# Patient Record
Sex: MALE | Race: WHITE
[De-identification: names, ages, dates, MRNs, and addresses within clinical notes are randomized per-mention and may not be internally consistent; named-entity substitution may affect disease eponyms.]

---

## 2017-06-25 ENCOUNTER — HOSPITAL ENCOUNTER (INPATIENT)
Dept: HOSPITAL 68 - ERH | Age: 62
LOS: 4 days | End: 2017-06-29
Attending: INTERNAL MEDICINE | Admitting: INTERNAL MEDICINE
Payer: COMMERCIAL

## 2017-06-25 VITALS — HEIGHT: 67 IN | WEIGHT: 150 LBS | BODY MASS INDEX: 23.54 KG/M2

## 2017-06-25 VITALS — SYSTOLIC BLOOD PRESSURE: 114 MMHG | DIASTOLIC BLOOD PRESSURE: 76 MMHG

## 2017-06-25 VITALS — SYSTOLIC BLOOD PRESSURE: 130 MMHG | DIASTOLIC BLOOD PRESSURE: 80 MMHG

## 2017-06-25 VITALS — SYSTOLIC BLOOD PRESSURE: 118 MMHG | DIASTOLIC BLOOD PRESSURE: 73 MMHG

## 2017-06-25 DIAGNOSIS — K29.80: ICD-10-CM

## 2017-06-25 DIAGNOSIS — K29.70: ICD-10-CM

## 2017-06-25 DIAGNOSIS — Z87.891: ICD-10-CM

## 2017-06-25 DIAGNOSIS — R07.9: ICD-10-CM

## 2017-06-25 DIAGNOSIS — K22.70: ICD-10-CM

## 2017-06-25 DIAGNOSIS — F10.129: Primary | ICD-10-CM

## 2017-06-25 DIAGNOSIS — I10: ICD-10-CM

## 2017-06-25 DIAGNOSIS — J44.9: ICD-10-CM
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
## 2017-06-25 DIAGNOSIS — K21.9: ICD-10-CM

## 2017-06-25 DIAGNOSIS — E78.5: ICD-10-CM

## 2017-06-25 DIAGNOSIS — F14.10: ICD-10-CM

## 2017-06-25 DIAGNOSIS — E87.2: ICD-10-CM

## 2017-06-25 DIAGNOSIS — N17.9: ICD-10-CM

## 2017-06-25 LAB
ABSOLUTE GRANULOCYTE CT: 4.2 /CUMM (ref 1.4–6.5)
BASOPHILS # BLD: 0 /CUMM (ref 0–0.2)
BASOPHILS NFR BLD: 0.7 % (ref 0–2)
EOSINOPHIL # BLD: 0.2 /CUMM (ref 0–0.7)
EOSINOPHIL NFR BLD: 2.5 % (ref 0–5)
ERYTHROCYTE [DISTWIDTH] IN BLOOD BY AUTOMATED COUNT: 13.9 % (ref 11.5–14.5)
GRANULOCYTES NFR BLD: 63.7 % (ref 42.2–75.2)
HCT VFR BLD CALC: 35 % (ref 42–52)
LYMPHOCYTES # BLD: 1.7 /CUMM (ref 1.2–3.4)
MCH RBC QN AUTO: 29.5 PG (ref 27–31)
MCHC RBC AUTO-ENTMCNC: 33.5 G/DL (ref 33–37)
MCV RBC AUTO: 88.2 FL (ref 80–94)
MONOCYTES # BLD: 0.5 /CUMM (ref 0.1–0.6)
PLATELET # BLD: 181 /CUMM (ref 130–400)
PMV BLD AUTO: 9.6 FL (ref 7.4–10.4)
RED BLOOD CELL CT: 3.97 /CUMM (ref 4.7–6.1)
WBC # BLD AUTO: 6.7 /CUMM (ref 4.8–10.8)

## 2017-06-25 PROCEDURE — 84133 ASSAY OF URINE POTASSIUM: CPT

## 2017-06-25 PROCEDURE — A9502 TC99M TETROFOSMIN: HCPCS

## 2017-06-25 PROCEDURE — G0480 DRUG TEST DEF 1-7 CLASSES: HCPCS

## 2017-06-25 PROCEDURE — 84300 ASSAY OF URINE SODIUM: CPT

## 2017-06-25 NOTE — RADIOLOGY REPORT
EXAMINATION:
XR CHEST PORTABLE
 
CLINICAL INFORMATION:
Altered mental status.
 
COMPARISON:
Multiple priors, most recent chest radiograph dated 05/19/2016.
 
TECHNIQUE:
Portable frontal view of the chest was obtained.
 
FINDINGS:
Mild bibasilar atelectasis. No focal airspace consolidation. No pleural
effusion or pneumothorax. Stable cardiomediastinal silhouette. No acute
osseous abnormality.
 
IMPRESSION:
Mild bibasilar atelectasis.

## 2017-06-25 NOTE — NUR
MEDICATED IV NARCAN PER EMAR WITH MINIMAL EFFECT.
SECURITY AT BEDSIDE TO WAND PT AND PT'S BELONGINGS.
WIFE AT BEDSIDE.
PT OCCASSIONALY TALKING GARBLED SPEECH, SAYING "JUST LET ME SLEEP".

## 2017-06-25 NOTE — HISTORY & PHYSICAL
CEZAR SALAZAR 06/25/17 2209:
General Information and HPI
MD Statement:
I have seen and personally examined PARISH PADILLA and documented this H&P.
 
The patient is a 62 year old M who presented with a patient stated chief 
complaint of unresponsiveness at home.
 
Source of Information: patient, family, old records
Exam Limitations: no limitations
History of Present Illness:
 
Mr Padilla is a 62-year-old man who was known to be in his usual state of health
until this a.m.  He has a past medical history of hypertension, hyperlipidemia, 
emphysema, peptic ulcer disease.  He was brought to [facility] after he 
became unresponsive at home after a binge alcoholic drink in the afternoon on 
the day of ER admission.
 
As per the patient, he drank heavily (10-12 x beer, hard liquor shots) all 
morning at a bar, and after reaching home he was unresponsive.  He did not have 
any prodromal symptoms before becoming unresponsive, but the wife who was 
present at the time of the event reported that the patient complained of chest 
pain.  Episode lasted for less than 30 minutes, with no loss of bladder bowel 
function, seizures.  After regaining consciousness, he was aware of his 
surroundings, and did not report any headache, lightheadedness, confusion, 
vision changes, any neurological symptoms.  The character of chest pain could 
not be a certain, as the patient did not recollect his symptomatology.  No 
palpitations, shortness of breath, pedal edema, orthopnea were noted.  No 
fatigue, unintentional weight loss was reported.
 
Reported occasional bright red bleeding per rectum, which he attributes it to 
external hemorrhoids.  No abdominal pain, fever, tenesmus, diarrhea were noted. 
Last colonoscopy found colonic polyp, and was advised to follow up every 3 years
with a repeat colonoscopy.  Upper endoscopy revealed Sexton's esophagus, for 
which he is on chronic PPI.  Works as a bread ; and past smoker 
40 pack year history-quit 12 years ago.  Daily alcohol use-4-5 x beers.  No 
IVDA.  Last cocaine use 3 days ago, occasional marijuana use.  Family history 
significant for coronary artery disease in brother and father (age less than 50 
years).  History of colon cancer in mother.
 
Allergies/Medications
Allergies:
Coded Allergies:
venom-honey bee (bee venom (honey bee)) (ANAPHYLAXIS 06/25/17)
morphine (VOMITING 05/19/16)
 
Compliance With Home Meds: POOR
 
Past History
 
Travel History
Traveled to [place] past 21 day No
 
Medical History
Blood Transfusion Hx: No
Neurological: NONE
EENT: NONE
Cardiovascular: hypertension, hyperlipidemia
Respiratory: NONE
Gastrointestinal: GERD, peptic ulcer disease
Hepatic: NONE
Renal: NONE
Musculoskeletal: NONE
Psychiatric: NONE
Endocrine: NONE
Blood Disorders: NONE
Cancer(s): NONE
GYN/Reproductive: NONE
History of MRSA: No
History of VRE: No
History of CDIFF: No
Isolation History: Standard
 
Surgical History
Surgical History: non-contributory
 
Past Family/Social History
 
Family History
Relations & Conditions if any
BROTHER (CAD( age < 50 yrs)).
FATHER (CAD( < 50 yrs)).
MOTHER
 
 
Psychosocial History
Where do you live? Home
Services at Home: None
Smoking Status: Former Smoker
ETOH Use: heavy use
Illicit Drug Use: UTD
 
Review of Systems
 
Review of Systems
Constitutional:
Denies: see HPI, chills, fever, weakness. 
EENTM:
Denies: blurred vision, double vision, hearing changes. 
Cardiovascular:
Reports: chest pain, peripheral edema.  Denies: edema, orthopena, palpitations, 
syncope. 
Respiratory:
Denies: cough, short of breath. 
GI:
Reports: bloody stool.  Denies: abdominal pain, diarrhea, melena, vomiting. 
Genitourinary:
Denies: discharge. 
Musculoskeletal:
Denies: back pain, joint pain. 
Skin:
Denies: change in skin color, change in hair/nails. 
Neurological/Psychological:
Denies: anxiety, confusion, headache, numbness, paresthesia. 
 
Exam & Diagnostic Data
Last 24 Hrs of Vital Signs/I&O
 Vital Signs
 
 
Date Time Temp Pulse Resp B/P B/P Pulse O2 O2 Flow FiO2
 
     Mean Ox Delivery Rate 
 
06/25 2138 97.8 81 20 130/80  95   
 
06/25 2130       Room Air  
 
06/25 2026 98.6 78 18 118/73     
 
06/25 2025 98.6 78 18 118/73  97 Room Air  
 
06/25 1830 98.0 76 16 114/76     
 
06/25 1806      97 Room Air  
 
06/25 1802  84 16 114/76  98 Room Air  
 
06/25 1711 98.8 68 17 91/55  94 Room Air  
 
06/25 1629  80 18 122/58  95 Nasal 2.0L 
 
       Cannula  
 
06/25 1535      95 Nasal 2.0L 
 
       Cannula  
 
06/25 1516  81 26 133/71  95 Nasal 3.0L 
 
       Cannula  
 
06/25 1454 98.7 78 16 157/81  96 Room Air  
 
 
 Intake & Output
 
 
 06/26 0800 06/26 0000 06/25 1600
 
Intake Total  0 
 
Output Total  400 
 
Balance  -400 
 
    
 
Intake, IV  0 
 
Intake, Oral  0 
 
Number  0 
 
Bowel   
 
Movements   
 
Output, Urine  400 
 
Patient  150 lb 
 
Weight   
 
Weight  Reported by Patient 
 
Measurement   
 
Method   
 
 
 
 
Physical Exam
General Appearance Alert, Oriented X3, Cooperative, No Acute Distress
Skin No Rashes, No Breakdown, No Significant Lesion
Skin Temp/Moisture Exam: Warm/Dry
Sepsis Skin Exam (color): Normal for Ethnicity
HEENT Atraumatic, PERRLA, EOMI
Neck Supple, No JVD, No thryomegaly, +2 Carotid Pulse wo Bruit, No LAD
Lymphatic submandibular lnpathy 1.5 cm
Cardiovascular Regular Rate, Normal S1, Normal S2, No Murmurs
Lungs Normal Air Movement, basal crackles
Abdomen Normal Bowel Sounds, Soft, No Tenderness, No Hepatospenomegaly
Neurological Normal Speech, Strength at 5/5 X4 Ext, Normal Tone, Sensation 
Intact, Cranial Nerves 3-12 NL, Reflexes 2+
Extremities No Cyanosis, No Edema, Normal Pulses
Vascular Normal Pulses, Pulses Symmetrical
Sepsis Peripheral Pulse Location: Dorsalis Pedis
Sepsis Peripheral Pulse Exam: Normal
Sepsis Cap Refill Exam: <2 Sec
Body Front and Back (Adult)
 
  1) hemorrhodal surgery scar
Last 24 Hrs of Labs/Jake:
 Laboratory Tests
 
06/25/17 2258:
Troponin I < 0.01
 
06/25/17 1630:
Urine Opiates Screen < 100.00, Methadone Screen < 40, Barbiturate Screen < 60, 
Ur Phencyclidine Scrn < 6.00, Amphetamines Screen < 100, U Benzodiazepines Scrn 
85, Urine Cocaine Screen > 1000  H, Urine Cannabis Screen 66.00  H, Ur Random 
Creatinine 46.1, Ur Random Sodium 76, Ur Random Potassium 11.7, Fraction Sodium 
Excret 2.7  H
 
06/25/17 1520:
Anion Gap 13, Estimated GFR 29  L, BUN/Creatinine Ratio 14.8, Glucose 94, 
Hemoglobin A1c Pending, Calcium 9.2, Magnesium 2.4  H, Iron 99, TIBC 315, 
Ferritin 89.1, Total Bilirubin 0.3, AST 19, ALT 25, Alkaline Phosphatase 58, 
Creatine Kinase 209  H, Troponin I < 0.01, Total Protein 6.7, Albumin 3.9, 
Globulin 2.8, Albumin/Globulin Ratio 1.4, CBC w Diff NO MAN DIFF REQ, RBC 3.97  
L, MCV 88.2, MCH 29.5, RDW 13.9, MPV 9.6, Gran % 63.7, Lymphocytes % 24.9, 
Monocytes % 8.2, Eosinophils % 2.5, Basophils % 0.7, Absolute Granulocytes 4.2, 
Absolute Lymphocytes 1.7, Absolute Monocytes 0.5, Absolute Eosinophils 0.2, 
Absolute Basophils 0, PUBS MCHC 33.5, Salicylates < 1.0, Acetaminophen < 10.0  L
, Serum Alcohol 246.0
 
 
Diagnostic Data
EKG Results
Normal sinus  rhythm
Normal axis
No ST TW changes
CXR Results
Mild bibasilar atelectasis.
Other Results
 CAT - CT HEAD WO IV CONTRAST
 
1. No acute intracranial hemorrhage or mass effect.
 
2. Mild prominence of the sulci and mild hypoattenuation of the
periventricular white matter, which may represent chronic small vessel
ischemic disease.
 
Assessment/Plan
Assessment:
 
His middle-aged man with a past history of heavy alcohol use, smoking, 
hypertension, family history of heart disease (multiple risk factors for 
coronary disease) is being evaluated for unresponsiveness.
 
At the time of admission, vitals-impression 98.7, pulse rate 78, respirations 16
, blood pressure 157/81, pulse ox 96% on room air.  Lab findings indicated no 
leukocytosis, anemia (hemoglobin 11.7-Baseline 15.3 mg/dl in 05/2016), platelets
181. 
 
Normal electrolytes, abnormal kidney function BUN 34, serum creatinine 2.3 (
baseline 0.9), FENa 2.7( kidney injury ? heavy drug use ).  Liver function test 
within normal limits.  Cardiac enzymes-within normal limits.  U tox was positive
for cocaine and cannabis.  EKG revealed normal sinus rhythm, normal axis, 
nonspecific ST changes.
 
Differential diagnoses: #1 syncope #3 arrhythmia #4 drug overdose #5 acute 
kidney injury
 
Below is the problem list and plan:
 
#1 syncope-exact cause is uncertain at this time, but alcohol/drug use seems 
more likely.  However, cardiac cause needs to be ruled out.  Patient has a 
strong family history and other risk factors.  Serial cardiac enzymes and 
electrocardiograms.  May need a stress test as an outpatient.  Cardiac consult 
in the a.m. if the tests are abnormal.  History of chest pain is unclear, and 
hence no aspirin was given at this time.  Continue to monitor the patient on 
telemetry.  CT head was unremarkable.
 
#2 Anemia-an acute drop in H&H 15.3-->11.7.  Hemoccult negative.  Monitor for 
any sudden drop.  We will need a follow-up with the GI as an outpatient.  Iron 
studies.
 
#3 alcohol use, cocaine use-Ativan as per CIWA protocol, and standing order.  
Avoid all beta blockers.  Monitor vitals closely. 
 
#4 acute kidney injury-likely from alcohol/drug use.  Creatinine kinase slightly
elevated; recheck in the a.m.  Cocaine is known to cause myoglobulinuria. 
Elevated FENa.  Fluids.
 
#5 history of GERD/Sexton's-continue home dose of PPI.
 
#6 DVT prophylaxis-mechanical.
 
As Ranked By This Provider
Problem List:
 1. DENISE (acute kidney injury)
 
 2. Alcohol intoxication
 
 3. Altered mental status
 
 
Core Measures/Miscellaneous
 
Acute Coronary Syndrome
ACS Diagnosis: No
 
Cerebrovascular Accident
CVA/TIA Diagnosis: No
 
Congestive Heart Failure
CHF Diagnosis: No
 
VTE (View Protocol)
VTE Risk Factors: Age > 40
No [facility] VTE prophylaxis d/t: No contraindications
No VTE Pharm Prophylaxis d/t: No contraindications
VTE Diagnosis: No
VTE Type: NONE
VTE Confirmed by (Test): NONE
 
Sepsis (View Protocol)
Severe Sepsis Present: No
 
Septic Shock
Septic Shock Present: No
 
Miscellaneous Documentation
Attending Case Discussed With:
LEILA GUARDADO MD
 
Primary Care Physician:
REYNA MADERA MD
 
Patient sees these Specialists
Dr. Jewell
Level of Patient Care: Telemetry
 
LASHAUN HERNANDES,[facility] 06/25/17 7974:
General Information and HPI
 
Allergies/Medications
Home Med list
Albuterol Sulfate (Proair Hfa) 90 MCG HFA.AER.AD   2 PUF INH AD PRN EMPHYSEMA  (
Reported)
Amlodipine Besylate 5 MG TABLET   1 TAB PO DAILY Hypertension
Folic Acid 1 MG TABLET   1 TAB PO DAILY SUPPLEMENT
Multivitamin (One Daily Multivitamin) 1 EACH TABLET   1 TAB PO DAILY SUPPLEMENT
Pantoprazole Sodium 40 MG TABLET.DR   1 TAB PO DAILY GI  (Reported)
Thiamine HCl (Vitamin B-1) 100 MG TABLET   1 TAB PO DAILY SUPPLEMENT
 
 
 
Resident Review Statement
Resident Statement: examined this patient, discussed with intern, agreed with 
intern, discussed with family
Other Findings:
The patient is a 62-year-old man with a past medical history of hypertension, 
GERD, peptic ulcer disease, emphysema, and lower GI bleeding from hemorrhoids.  
He was brought in by ambulance today after being found unresponsive at home by 
his wife.  According to his wife the patient has been having headaches recently 
for the past 1-2 weeks.  He went to a bar this morning on 9 AM and drank 10-12 
beers and over 5 shots of whiskey up to 2 PM this afternoon after which she 
returned home.  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 
his club. Shortly after arriving home she saw him lay down on his bed. Some 
minutes later, she went to arouse him and found him to be unresponsive. He 
subsequently became semiconscious and was going in and out of consciousness with
his eyes half open. At this time he began to complain of severe retrosternal 
chest pain and shortness of breath.  This chest pain episode lasted for about 1 
hour before she called 911, and he was brought to the hospital.  EMS at the 
scene said that his pupils were miotic and he was having bradypnea.  He received
1 dose of Narcan in the field.  Subsequently became more aroused in the ER and 
is now alert and awake.  He does admit to using cocaine, however he stated that 
his last use was sniffing a single line 3 days ago.
 
Vital signs on admission in the ER showed a temperature of 98.7 Fahrenheit, 
pulse of 78 bpm, respiratory rate of 16/min, blood pressure 157/81 mmhg, pulse 
oximetry of 96 on room air.
 
Physical Exam
General: Appearance Alert, oriented 3, not in distress
HEENT: EOMI, Mucous Membranes moist
Neck: 1.5 cm 1.5 cm soft mobile left submandibular lymph node, no thyromegaly
Cardiovascular: Normal S1, Normal S2, no murmurs
Lungs: Normal Air Movement, Diminshed breath sounds in the lung bases 
bilaterally with few fine crepitations
Abdomen: Soft, No Tenderness, Normal Bowel Sounds
Rectal: Normal rectal tone, stool guaiac negative
Back: No CVA tenderness 
Neurological: Nomal Speech, Strength 5/5 in bilateral lower and upper limbs, no 
tremor
Extremities: No pedal edema
Vascular: Pulses Symmetrical, Skin is warm to touch
 
Labs at admission showed CBC with WBC of 6700, hemoglobin of 11.7 g/dL, 
hematocrit of 35%, platelet count of 181,000. Chemistries showed sodium of 143, 
potassium of 4.1, creatinine of 2.3 mg/DL, glucose 94 mg per DL, magnesium 2.4, 
creatinine kinase 29.  Total bili AST and ALT were all normal at 0.3, 19 and 25 
respectively.  Alkaline phosphatase was normal at 58. 
 
Urine toxicology was positive for cocaine greater than 1000 ng/ml, cannabis at 
68 and serum alcohol 246.
 
Assessment
This patient presents with syncope shortly after an alcohol binge with attendant
recent history of cocaine use.  He also confesses to having some chest pain 
shortly after the syncopal episode but denies chest pain at present.  She is 
head CT showed no acute lesion.  His symptoms are concerning for an acute 
coronary syndrome given his recent cocaine use (He probably used cocaine today, 
even though he denies this). Otherwise a syncope from cocaine overdose is also a
possibility. Acute alcohol intoxication could also be responsible for these 
symptoms. His wife did admit to prior episodes of chest discomfort resulting in 
ED visits in the past which were attributed to GERD.  However he should be 
monitored on telemetry for any cardiac events.  In addition we will manage his 
alcohol withdrawal with as needed and standing Ativan.  His creatinine is 
elevated and his acute kidney injury is likely prerenal due to dehydration, 
however cocaine-induced acue tubular necrosis or acute interstitial neprhritis 
must be considered in the differential.
 
Problem list
1.  Syncope
2.  Alcohol withdrawal and cocaine abuse for detox
3.  Acute kidney injury
4.  Normocytic anemia
5.  Hypertension
6.  Emphysema
7.  Left submandibular lymph node
 
Plan
1.  Syncope
* Admit to telemetry for monitoring
* Serial EKGs 3 and troponin 3
* Consider echocardiogram in the AM
* Monitor blood pressure heart rate closely
 
2.  Alcohol withdrawal and cocaine abuse for detox
* IV Ativan as per CIWA protocol
* By mouth Ativan 2 mg every 6 hours standing
* IV banana bag 1 then continue with by mouth thiamine, B12 and folate
 
3.  Acute kidney injury
* Hydrate with IV banana bag 1 then we will continue hydration with IV normal 
saline at 75 mL an hour
* Add on Urinalysis and Urine electrolytes/FENA to investigte cocaine-induced 
acue tubular necrosis or acute interstitial neprhritis
* Repeat BEP in the morning and monitor renal function
* Monitor fluid input-output closely
 
4.  Normocytic anemia
* Hemoglobin has dropped since last measured in 2016
* Stools were guaiac-negative
* We'll send iron TIBC and ferritin with vitamin B-12
 
5.  Hypertension
* Continue by mouth lisinopril 20 mg daily
* Monitor blood pressure closely
 
6.  Emphysema
* TRC evaluation for possible nebulizer therapy
* Continue albuterol inhaler every 4 hours when necessary as needed for 
shortness of breath
 
7. Left submandibular lymph node
* Consider inpatient or outpatient ultrasound of neck followed by lymph node 
biopsy
 
Pain pathway:.  Tylenol 650 mg every 6 when necessary for mild pain, by mouth 
Motrin 600 mg every 6 when necessary for moderate pain
 
DVT prophylaxis with Alps
Patient is full code
 
 
LEILA GUARDADO 06/26/17 0401:
Attending MD Review Statement
 
Attending Statement
Attending MD Statement: examined this patient, discuss w/resident/PA/NP, agreed 
w/resident/PA/NP, reviewed EMR data (avail), reviewed images, amended to note
Attending Assessment/Plan:
 
CC: Passing out, CP 
PMH: HTN, COPD, colonic polyps, hemorrhoids
 
Patient was brought in ER through EMS for altered mental status. History is 
partly provided by patient and his wife. Patient states that he had breakfast 
and then he went out to drink beer, he drank few of them and then few shots of 
vodka. He also endorses cocaine use, then he drove home and he was feeling 
uneasy, not well so his wife called EMS. According to his wife patient came home
, directly went to bed, when in few minutes she was checking on him he was not 
responding, he was in and out of his sensorium and in between complaining of 
chest pain. When he was not responding appropriately she called EMS. Patient 
received a dose of Narcan in ER and 1 L normal saline bolus. 14 point ROS 
unremarkable except patient has intermittent bright red blood per rectum, last 
episode 3 days back. Patient states that he has multiple polyps in his colon and
gets frequent colonoscopies.
 
Vitals: Afebrile, pulse in 70s, RR 16, blood pressure 157/81 transiently went to
91/55 improved to 114/76, saturating well on room air
On exam: A O 3, cooperative, no acute distress, neck supple, JVD normal, no 
lymphadenopathy, mucosa dry, no focal neurological deficit, no dependent edema, 
no obvious skin rashes or inflammation CVS: S1-S2, RRR. RS: Clear to auscultate 
bilaterally. Abdomen: Soft, NT, ND, bowel sounds present. 
Labs: Hemoglobin 11.1, MCV 88.2, otherwise CBC unremarkable. Bicarbonate 19, 
anion gap 13, BUN 34, creatinine 2.3, LFT unremarkable, troponin less than 0.01,
 
U tox positive for cocaine and THC
Serum alcohol 246
 
Imaging:
Head CT:
1. No acute intracranial hemorrhage or mass effect. 
2. Mild prominence of the sulci and mild hypoattenuation of the
periventricular white matter, which may represent chronic small vessel ischemic 
disease. 
CXR: Mild bibasilar atelectasis. 
 
A and P 
 
62-year-old male with a past medical history significant for HTN, COPD, 
alcoholism, lower GI bleed presented to ER after possible syncope. History is 
provided by patient's wife who stated that patient was not responding at home, 
she did not notice any seizure-like activity, bladder or bowel incontinence. 
Patient was complaining of chest pain. Patient had high alcohol level at arrival
, received a dose of Narcan and IV hydration. eventually patient was more 
arousable. It is likely that patient may have lost consciousness secondary to 
alcoholism but other causes of syncope should be ruled out given his age and 
comorbidities. He also endorses cocaine use and had chest pain, ACS should be 
ruled out. Creatinine is elevated and hemoglobin trending down as compared to 
previous labs. 
 
+ Syncope
+ Chest pain
+ Alcoholism
+ Cocaine abuse
+ Acute kidney injury
+ Metabolic acidosis
+ History of HTN, COPD, lower GI bleed
 
- Admit to telemetry
- Continuous telemetry monitoring
- Serial troponin and EKG
- Aspirin 81 mg
- Continue home doses of antihypertensives
- Avoid beta blocker
- Gentle hydration, high-dose thiamine
- Scheduled Ativan 2 mg by mouth every 8 hour and when necessary Ativan 
according to CIWA score
- Replace electrolytes
- Strict I's and O's
- Post void bladder scan
- Check CPK today and tomorrow
- Check orthostatic vitals in a.m.
- No obvious murmurs on auscultation: No 2-D echo at this time for syncope 
workup
- When necessary nebulization
- DVT prophylaxis with heparin

## 2017-06-25 NOTE — NUR
REPORT RECIEVED FROM BRITANY HOLT.
PATIENT TO CT SCAN OFF CM - OK PER DR. SANCHES. CEDRIC SRINIVASAN ACCOMPANIED
PATIENT.

## 2017-06-25 NOTE — NUR
PT UNABLE TO GIVE URINE SAMPLE AT THIS TIME, MORE EASILY AROUSABLE. STATES WILL
TRY AGAIN IN 15 MINS.

## 2017-06-25 NOTE — CT SCAN REPORT
EXAMINATION:
CT HEAD WITHOUT CONTRAST
 
CLINICAL INFORMATION:
Altered mental status.
 
COMPARISON:
No relevant prior studies are available for comparison.
 
TECHNIQUE:
Contiguous axial imaging was performed from the skull base to vertex without
intravenous administration of contrast.
 
DLP:
879.77 mGy-cm
 
FINDINGS:
There is no evidence of acute intracranial hemorrhage or territorial
infarction. No abnormal mass effect or midline shift is seen. Gray to white
matter differentiation is well preserved. No extra-axial fluid collections
are identified.
 
The ventricles are normal in size. There is mild prominence of the sulci.
There is mild hypoattenuation of the periventricular white matter, which
could represent chronic small vessel ischemic disease. The osseous structures
and soft tissues are normal. The mastoid air cells and visualized portions of
the paranasal sinuses are well aerated.
 
IMPRESSION:
1. No acute intracranial hemorrhage or mass effect.
 
2. Mild prominence of the sulci and mild hypoattenuation of the
periventricular white matter, which may represent chronic small vessel
ischemic disease.
 
If the patient's symptomatology continues an MRI scan could be considered if
patient is an appropriate candidate and there are no contraindications to
MRI.

## 2017-06-25 NOTE — NUR
PT VOIDED 500 ML CLEAR URINE INTO URINAL. UTOX SENT WITH UA/CNS TUBES
REPORT GIVEN TO COBY HOLT, PT MOVED TO ROOM 2 WITH SITTER

## 2017-06-25 NOTE — NUR
ASSUMED CARE OF PT FROM JONATHON TENORIO. PT NSR ON CARDIAC MONITOR. AWAITING TELE
ADMISSION AT THIS TIME.

## 2017-06-25 NOTE — ED GENERAL ADULT
History of Present Illness
 
General
Chief Complaint: Altered Mental Status
Stated Complaint: BIBA AMS
Source: patient
Exam Limitations: not alert/orientated, clinical condition, poor historian
 
Vital Signs & Intake/Output
Vital Signs & Intake/Output
 Vital Signs
 
 
Date Time Temp Pulse Resp B/P B/P Pulse O2 O2 Flow FiO2
 
     Mean Ox Delivery Rate 
 
 1830 98.0 76 16 114/76     
 
 1806      97 Room Air  
 
 1802  84 16 114/76  98 Room Air  
 
 1711 98.8 68 17 91/55  94 Room Air  
 
 1629  80 18 122/58  95 Nasal 2.0L 
 
       Cannula  
 
 1535      95 Nasal 2.0L 
 
       Cannula  
 
 1516  81 26 133/71  95 Nasal 3.0L 
 
       Cannula  
 
 1454 98.7 78 16 157/81  96 Room Air  
 
 
Allergies
Coded Allergies:
venom-honey bee (bee venom (honey bee)) (ANAPHYLAXIS 17)
morphine (VOMITING 16)
 
Reconcile Medications
Albuterol Sulfate (Proair Hfa) 90 MCG HFA.AER.AD   2 PUF INH AD PRN EMPHYSEMA  (
Reported)
Lisinopril 20 MG TABLET   1 TAB PO DAILY BP  (Reported)
Pantoprazole Sodium 40 MG TABLET.DR   1 TAB PO DAILY GI  (Reported)
 
Triage Nurses Notes Reviewed? yes
Onset: Abrupt
Duration: hour(s):
Timing: recent history
HPI:
 
 
 
17
4 pm
This is a 62-year-old man who presents to the emergency department unresponsive.
 According to EMS and the patient's wife he was drinking at a bar today.  He 
drove home and was intoxicated.  Shortly after he became unresponsive.  He did 
have miotic pupils and was given Narcan in the field.  There is no focal 
weakness.  He has intermittent periods of bradyapena.
Says the symptoms were abrupt, the duration is really unknown, the severity 
significant; as his symptoms required him to come to the emergency department by
ambulance.
 
Past History
 
Travel History
Traveled to [place] past 21 day No
 
Medical History
Any Pertinent Medical History? see below for history
Cardiovascular: hypertension, hyperlipidemia
Gastrointestinal: GERD, peptic ulcer disease
 
Surgical History
Surgical History: non-contributory
 
Psychosocial History
Who do you live with Family
Services at Home None
What is your primary language English
Tobacco Use: UN
ETOH Use: 5
Illicit Drug Use: UTD
 
Family History
Hx Contributory? No
 
Review of Systems
 
Review of Systems
Constitutional:
Denies: fever. 
EENTM:
Reports: no symptoms. 
Respiratory:
Reports: see HPI. 
Cardiovascular:
Denies: chest pain. 
GI:
Reports: no symptoms. 
Genitourinary:
Reports: no symptoms. 
Musculoskeletal:
Reports: no symptoms. 
Skin:
Reports: no symptoms. 
Neurological/Psychological:
Reports: confusion. 
Hematologic/Endocrine:
Denies: bruising, bleeding. 
 
Physical Exam
 
Physical Exam
General Appearance: well developed/nourished, RESPONDS TO PAIN
Head: atraumatic
Eyes:
Bilateral: PERRL, EOMI. 
Ears, Nose, Throat: normal pharynx
Neck: normal inspection, supple
Respiratory: normal breath sounds, chest non-tender, BRADYPENA
Cardiovascular: regular rate/rhythm
Peripheral Pulses:
4+ radial (R), 4+ radial (L)
Back: decreased range of motion
Extremities: pedal edema
Neurologic/Psych: RESPONDS TO PAIN
Skin: intact, normal color, warm/dry
 
Core Measures
ACS in differential dx? No
CVA/TIA Diagnosis: No
Severe Sepsis Present: No
Septic Shock Present: No
 
Progress
Differential Diagnoses
I considered the following diagnoses in my evaluation of the patient: [Alcohol 
intoxication, substance abuse, TIA, CVA, sepsis]
 
Plan of Care:
 Orders
 
 
Procedure Date/time Status
 
Heart Healthy Diet  B Active
 
Patient Data  1915 Active
 
Admit to inpatient  1848 Active
 
Code Status  1848 Active
 
CIWA  1830 Active
 
Continuous Observation Monitor  1543 Active
 
URINE DRUG SCREEN FOR ER ONLY  1515 Complete
 
ACETOMINOPHEN  1515 Complete
 
TROPONIN LEVEL  1515 Complete
 
SALICYLATE  1515 Complete
 
ETHANOL  1515 Complete
 
COMPREHENSIVE METABOLIC PANEL  1515 Complete
 
CBC WITHOUT DIFFERENTIAL  1515 Complete
 
Intake & Output  1456 Active
 
EKG  1454 Active
 
 
 Laboratory Tests
 
 
 
17 1630:
Urine Opiates Screen < 100.00, Methadone Screen < 40, Barbiturate Screen < 60, 
Ur Phencyclidine Scrn < 6.00, Amphetamines Screen < 100, U Benzodiazepines Scrn 
85, Urine Cocaine Screen > 1000  H, Urine Cannabis Screen 66.00  H
 
17 1520:
Anion Gap 13, Estimated GFR 29  L, BUN/Creatinine Ratio 14.8, Glucose 94, 
Calcium 9.2, Total Bilirubin 0.3, AST 19, ALT 25, Alkaline Phosphatase 58, 
Troponin I < 0.01, Total Protein 6.7, Albumin 3.9, Globulin 2.8, Albumin/
Globulin Ratio 1.4, CBC w Diff NO MAN DIFF REQ, RBC 3.97  L, MCV 88.2, MCH 29.5,
RDW 13.9, MPV 9.6, Gran % 63.7, Lymphocytes % 24.9, Monocytes % 8.2, Eosinophils
% 2.5, Basophils % 0.7, Absolute Granulocytes 4.2, Absolute Lymphocytes 1.7, 
Absolute Monocytes 0.5, Absolute Eosinophils 0.2, Absolute Basophils 0, PUBS 
MCHC 33.5, Salicylates < 1.0, Acetaminophen < 10.0  L, Serum Alcohol 246.0
Initial ED EKG: NSR
Prior EKG: unchanged
Comments:
PATIENT: PARISH YOUNG  MEDICAL RECORD NO: [id number]
PRESENT AGE: 62  PATIENT ACCOUNT NO: [id number]
: 59  LOCATION: [facility]
ORDERING PHYSICIAN: JOSE JUAN SANCHES DO  
 
  SERVICE DATE: 
EXAM TYPE: CAT - CT HEAD WO IV CONTRAST
 
EXAMINATION:
CT HEAD WITHOUT CONTRAST
 
CLINICAL INFORMATION:
Altered mental status.
 
COMPARISON:
No relevant prior studies are available for comparison.
 
TECHNIQUE:
Contiguous axial imaging was performed from the skull base to vertex without
intravenous administration of contrast.
 
DLP:
879.77 mGy-cm
 
FINDINGS:
There is no evidence of acute intracranial hemorrhage or territorial
infarction. No abnormal mass effect or midline shift is seen. Gray to white
matter differentiation is well preserved. No extra-axial fluid collections
are identified.
 
The ventricles are normal in size. There is mild prominence of the sulci.
There is mild hypoattenuation of the periventricular white matter, which
could represent chronic small vessel ischemic disease. The osseous structures
and soft tissues are normal. The mastoid air cells and visualized portions of
the paranasal sinuses are well aerated.
 
IMPRESSION:
1. No acute intracranial hemorrhage or mass effect.
 
2. Mild prominence of the sulci and mild hypoattenuation of the
periventricular white matter, which may represent chronic small vessel
ischemic disease.
 
If the patient's symptomatology continues an MRI scan could be considered if
patient is an appropriate candidate and there are no contraindications to
MRI.
 
 
DICTATED BY: DONALDO PAEZ MD 
DATE/TIME DICTATED:17
:[email or web address] 
DATE/TIME TRANSCRIBED:17
 
CONFIDENTIAL, DO NOT COPY WITHOUT APPROPRIATE AUTHORIZATION.
 
 <Electronically signed in Other Vendor System>                                 
          
                                           SIGNED BY: DONALDO PAEZ MD  1544
 
 
 
 
Departure
 
Departure
Disposition: STILL A PATIENT
Condition: Stable
Clinical Impression
Primary Impression: DENISE (acute kidney injury)
Secondary Impressions: Altered mental status, TIA (transient ischemic attack)
Referrals:
ASNTY HERNANDES,REYNA KOTHARI (PCP/Family)
 
Departure Forms:
Customer Survey
General Discharge Information
Comments
 
 
Patient was treated with IV fluids, METAL STAUS IMPROVED.
 
Admission Note
Spoke With:
LEILA GUARDADO MD
Documentation of Exam:
Documentation of any treatments & extenuating circumstances including Concerns 
Regarding Discharge (functional status, medication knowledge or non-compliance, 
living conditions, etc.) that warrant an admission rather than observation: [He 
needs admission for IV fluids, serial neuro exams, serial troponins, serial EKG,
repeat kidney function]
 
 
Critical Care Note
 
Critical Care Note
Critical Care Time: 30-74 min

## 2017-06-25 NOTE — NUR
DR SANCHES IN ROOM TO REEVALUATE PATIENT. FAMILY CALLING FOR UPDATES AND MD
INFORMED TO PROVIDE UPDATE

## 2017-06-25 NOTE — NUR
PT BIBA FROM HOME FOR ALTERED MENTAL STATUS & ETOH
USE WITH PERIODS OF APNEA. PT WAS AT THE BAR
TODAY, DRANK WHISKEY, AND THEN DROVE HOME. 1 HOUR
AFTER BEING HOME PT BEGAN TO C/O SOB. WIFE CALLED
911. UPON EMS ARRIVAL TO PT'S HOME PT WAS ALERT
WITH PINPOINT PUPILS, AND PERIODS OF APNEA. 
PT RECEIVED INTRANASAL NARCAN WITH NO EFFECT.
PT ADMITTED TO EMS THAT HE OCCASIONALLY SMOKES
CRACK. PT ARRIVES UNREPONSIVE TO PAINFUL/VERBAL
STIMULI. PT HAS PRE-HOSP TO [facility].
#2O EST TO . DR SANCHES IN FOR EVAL UPON PT'S 
ARRIVAL TO ROOM. CHANGED INTO GOWN. PLACED ON
MONITOR.

## 2017-06-25 NOTE — NUR
1 BELONGINGS BAG GIVEN TO SECURITY, PATIENTS GLASSES IN BAG
PATIENT'S WALLET GIVEN TO WIFE/SON, OK PER PT

## 2017-06-26 VITALS — SYSTOLIC BLOOD PRESSURE: 156 MMHG | DIASTOLIC BLOOD PRESSURE: 90 MMHG

## 2017-06-26 VITALS — DIASTOLIC BLOOD PRESSURE: 80 MMHG | SYSTOLIC BLOOD PRESSURE: 140 MMHG

## 2017-06-26 LAB
ABSOLUTE GRANULOCYTE CT: 3.7 /CUMM (ref 1.4–6.5)
BASOPHILS # BLD: 0 /CUMM (ref 0–0.2)
BASOPHILS NFR BLD: 0.7 % (ref 0–2)
EOSINOPHIL # BLD: 0.2 /CUMM (ref 0–0.7)
EOSINOPHIL NFR BLD: 2.9 % (ref 0–5)
ERYTHROCYTE [DISTWIDTH] IN BLOOD BY AUTOMATED COUNT: 14.3 % (ref 11.5–14.5)
GRANULOCYTES NFR BLD: 68 % (ref 42.2–75.2)
HCT VFR BLD CALC: 34.3 % (ref 42–52)
LYMPHOCYTES # BLD: 1 /CUMM (ref 1.2–3.4)
MCH RBC QN AUTO: 29.9 PG (ref 27–31)
MCHC RBC AUTO-ENTMCNC: 33.3 G/DL (ref 33–37)
MCV RBC AUTO: 89.6 FL (ref 80–94)
MONOCYTES # BLD: 0.5 /CUMM (ref 0.1–0.6)
PLATELET # BLD: 158 /CUMM (ref 130–400)
PMV BLD AUTO: 9.9 FL (ref 7.4–10.4)
RED BLOOD CELL CT: 3.83 /CUMM (ref 4.7–6.1)
WBC # BLD AUTO: 5.4 /CUMM (ref 4.8–10.8)

## 2017-06-26 NOTE — CONS- CARDIOLOGY
General Information and HPI
 
Consulting Request
Date of Consult: 06/26/17
Requested By:
CHRIS ARREAGA MD
 
Reason for Consult:
Syncopal episode followed by chest pain.
Source of Information: patient, family, old records
Exam Limitations: poor historian
History of Present Illness:
Mr. Prudencio Padilla is a 62-year-old  male with a history of very heavy
former tobacco use (discontinued 3 ppd 20 yr hx of tobacco use 12 years ago) 
COPD, peptic ulcer disease, hypertension, dyslipidemia, "borderline" diabetes 
mellitus, and very strong history for premature coronary artery disease (father 
succumbed to an MI at age 40 years, brother succumbed to an MI at age 40 years, 
another brother had several myocardial infarctions and succumbed to an MI at age
50 years) who presented [facility] on 06/25/2017 from home via ambulance after 
becoming unresponsive following a morning and early afternoon of binge drinking 
(10-12 beers; multiple shots of liquor).
 
The patient cannot recall anything prior to his "coming to an ambulance", but 
does state that he then recalls experiencing anterior chest "heaviness" of 5/10 
intensity without radiation or associated symptoms that lasted approximately 20 
minutes before subsiding.
 
He denies ever experiencing similar symptoms or having any history of coronary, 
valvular, dysrhythmic/conduction disease or cardiomyopathy.  
 
 
 
Allergies/Medications
Allergies:
Coded Allergies:
venom-honey bee (bee venom (honey bee)) (ANAPHYLAXIS 06/25/17)
morphine (VOMITING 05/19/16)
 
Home Med List:
Albuterol Sulfate (Proair Hfa) 90 MCG HFA.AER.AD   2 PUF INH AD PRN EMPHYSEMA  (
Reported)
Lisinopril 20 MG TABLET   1 TAB PO DAILY BP  (Reported)
Pantoprazole Sodium 40 MG TABLET.DR   1 TAB PO DAILY GI  (Reported)
 
 
Review of Systems
Review of Systems:
A 14 point system review was obtained and was noncontributory, other than for 
the fact the patient wears glasses, has had recurrent "pleurisy", and easy 
bruisability.
 
Past History
 
Travel History
Traveled to [place] past 21 day No
 
Medical History
Blood Transfusion Hx: No
Neurological: NONE
EENT: NONE
Cardiovascular: hypertension, hyperlipidemia
Respiratory: NONE
Gastrointestinal: GERD, peptic ulcer disease
Hepatic: NONE
Renal: NONE
Musculoskeletal: NONE
Psychiatric: NONE
Endocrine: NONE
Blood Disorders: NONE
Cancer(s): NONE
GYN/Reproductive: NONE
 
Surgical History
Surgical History: non-contributory
 
Family History
Relations & Conditions If Any:
BROTHER (CAD( age < 50 yrs)).
FATHER (CAD( < 50 yrs)).
MOTHER
 
 
Psychosocial History
Where Do You Live? Home
Services at Home: None
Smoking Status: Former Smoker
ETOH Use: heavy use
Illicit Drug Use: UTD
 
Exam & Diagnostic Data
Vital Signs and I&O
Vital Signs
 
 
Date Time Temp Pulse Resp B/P B/P Pulse O2 O2 Flow FiO2
 
     Mean Ox Delivery Rate 
 
06/26 1800 98.0 90 18 140/80     
 
06/26 1640 98.0 90 18 140/80  93 Room Air  
 
06/26 1600 98.0 90 18 140/80     
 
06/26 1004       Room Air  
 
06/26 0921      93 Room Air  
 
06/26 0801 97.9 81 14 156/90  94 Room Air  
 
06/26 0800       Room Air  
 
06/26 0800 97.9 81 20 156/90     
 
06/25 2138 97.8 81 20 130/80  95   
 
06/25 2130       Room Air  
 
06/25 2026 98.6 78 18 118/73     
 
06/25 2025 98.6 78 18 118/73  97 Room Air  
 
 
 Intake & Output
 
 
 06/26 1600 06/26 0800 06/26 0000 06/25 1600 06/25 0800 06/25 0000
 
Intake Total 1482 1375 0   
 
Output Total   400   
 
Balance 1482 1375 -400   
 
       
 
Intake,  875 0   
 
Intake, Oral 762 500 0   
 
Number  0 0   
 
Bowel      
 
Movements      
 
Output, Urine   400   
 
Patient   150 lb   
 
Weight      
 
Weight   Reported by Patient   
 
Measurement      
 
Method      
 
 
 
Physical Exam:
Well-developed, well-nourished middle-aged  male in no acute distress 
with nasal oxygen in place.
Vital signs: See above.
HEENT: Normocephalic, atraumatic, EOMI, slightly dry mucous membranes.
Neck: No JVD, no bruits.
Lungs: Decreased breath sounds bilaterally and otherwise clear.
Heart: S1, S2 with no murmur, gallop, or rub appreciated.  PMI fifth ICS at MCL.
Abdomen: Soft, nontender, positive bowel sounds.
Extremities: No edema.
Peripheral pulses: Symmetrical and intact.
Labs/Jake Results:
 Laboratory Tests
 
 
 06/26 06/25
 
 0635 2258
 
Chemistry  
 
  Sodium (137 - 145 mmol/L) 140 
 
  Potassium (3.5 - 5.1 mmol/L) 4.6 
 
  Chloride (98 - 107 mmol/L) 109  H 
 
  Carbon Dioxide (22 - 30 mmol/L) 24 
 
  Anion Gap (5 - 16) 7 
 
  BUN (9 - 20 mg/dL) 27  H 
 
  Creatinine (0.7 - 1.2 mg/dL) 1.8  H 
 
  Estimated GFR (>60 ml/min) 39  L 
 
  BUN/Creatinine Ratio (7 - 25 %) 15.0 
 
  Creatine Kinase (55 - 170 U/L) 131 
 
  Troponin I (<0.11 ng/ml)  < 0.01
 
Hematology  
 
  CBC w Diff NO MAN DIFF REQ 
 
  WBC (4.8 - 10.8 /CUMM) 5.4 
 
  RBC (4.70 - 6.10 /CUMM) 3.83  L 
 
  Hgb (14.0 - 18.0 G/DL) 11.4  L 
 
  Hct (42 - 52 %) 34.3  L 
 
  MCV (80.0 - 94.0 FL) 89.6 
 
  MCH (27.0 - 31.0 PG) 29.9 
 
  RDW (11.5 - 14.5 %) 14.3 
 
  Plt Count (130 - 400 /CUMM) 158 
 
  MPV (7.4 - 10.4 FL) 9.9 
 
  Gran % (42.2 - 75.2 %) 68.0 
 
  Lymphocytes % (20.5 - 51.1 %) 19.1  L 
 
  Monocytes % (1.7 - 9.3 %) 9.3 
 
  Eosinophils % (0 - 5 %) 2.9 
 
  Basophils % (0.0 - 2.0 %) 0.7 
 
  Absolute Granulocytes (1.4 - 6.5 /CUMM) 3.7 
 
  Absolute Lymphocytes (1.2 - 3.4 /CUMM) 1.0  L 
 
  Absolute Monocytes (0.10 - 0.60 /CUMM) 0.5 
 
  Absolute Eosinophils (0.0 - 0.7 /CUMM) 0.2 
 
  Absolute Basophils (0.0 - 0.2 /CUMM) 0 
 
  PUBS MCHC (33.0 - 37.0 G/DL) 33.3 
 
 
 
 
 06/25 06/25
 
 1630 1630
 
Toxicology  
 
  Urine Opiates Screen (>2000 NG/ML)  < 100.00
 
  Methadone Screen (>300 NG/ML)  < 40
 
  Barbiturate Screen (>200 NG/ML)  < 60
 
  Ur Phencyclidine Scrn (>25 NG/ML)  < 6.00
 
  Amphetamines Screen (>1000 NG/ML)  < 100
 
  U Benzodiazepines Scrn (>200 NG/ML)  85
 
  Urine Cocaine Screen (>300 NG/ML)  > 1000  H
 
  Urine Cannabis Screen (>50 NG/ML)  66.00  H
 
Urines  
 
  Urinalysis LIGHT  H 
 
  Urine Color (YEL,AMB,STR) STRAW 
 
  Urine Clarity (CLEAR) CLEAR 
 
  Urine pH (5.0 - 8.0) 6.0 
 
  Ur Specific Gravity (1.001 - 1.035) 1.020 
 
  Urine Protein (NEG,<30 MG/DL) 100  H 
 
  Urine Ketones (NEG) NEG 
 
  Urine Nitrite (NEG) NEG 
 
  Urine Bilirubin (NEG) NEG 
 
  Urine Urobilinogen (0.1  -  1.0 EU/dl) 0.2 
 
  Ur Leukocyte Esterase (NEG) NEG 
 
  Ur Microscopic SEDIMENT EXAMINED 
 
  Urine RBC (0 - 5 /HPF) 15-25  H 
 
  Urine WBC (0 - 2 /HPF) 5-10  H 
 
  Ur Epithelial Cells (NONE,FEW) FEW 
 
  Urine Mucus (FEW,NONE) FEW 
 
  Urine Hemoglobin (NEG) LARGE  H 
 
  Ur Random Creatinine (mg/dL)  46.1
 
  Ur Random Sodium (30 - 90 mmol/L)  76
 
  Ur Random Potassium (mmol/L)  11.7
 
  Fraction Sodium Excret (<1% %)  2.7  H
 
  Urine Glucose (N MG/DL) NEG 
 
 
 
 
 06/25
 
 1520
 
Chemistry 
 
  Sodium (137 - 145 mmol/L) 143
 
  Potassium (3.5 - 5.1 mmol/L) 4.1
 
  Chloride (98 - 107 mmol/L) 111  H
 
  Carbon Dioxide (22 - 30 mmol/L) 19  L
 
  Anion Gap (5 - 16) 13
 
  BUN (9 - 20 mg/dL) 34  H
 
  Creatinine (0.7 - 1.2 mg/dL) 2.3  H
 
  Estimated GFR (>60 ml/min) 29  L
 
  BUN/Creatinine Ratio (7 - 25 %) 14.8
 
  Glucose (65 - 99 mg/dL) 94
 
  Hemoglobin A1c (4.2 - 5.8 %) 5.4
 
  Calcium (8.4 - 10.2 mg/dL) 9.2
 
  Magnesium (1.6 - 2.3 mg/dL) 2.4  H
 
  Iron (49 - 181 ug/dL) 99
 
  TIBC (261 - 462 ug/dL) 315
 
  Ferritin (17.9 - 464 ng/mL) 89.1
 
  Total Bilirubin (0.2 - 1.3 mg/dL) 0.3
 
  AST (17 - 59 U/L) 19
 
  ALT (21 - 72 U/L) 25
 
  Alkaline Phosphatase (< 127 U/L) 58
 
  Creatine Kinase (55 - 170 U/L) 209  H
 
  Troponin I (<0.11 ng/ml) < 0.01
 
  Total Protein (6.3 - 8.2 g/dL) 6.7
 
  Albumin (3.5 - 5.0 g/dL) 3.9
 
  Globulin (1.9 - 4.2 gm/dL) 2.8
 
  Albumin/Globulin Ratio (1.1 - 2.2 %) 1.4
 
Hematology 
 
  CBC w Diff NO MAN DIFF REQ
 
  WBC (4.8 - 10.8 /CUMM) 6.7
 
  RBC (4.70 - 6.10 /CUMM) 3.97  L
 
  Hgb (14.0 - 18.0 G/DL) 11.7  L
 
  Hct (42 - 52 %) 35.0  L
 
  MCV (80.0 - 94.0 FL) 88.2
 
  MCH (27.0 - 31.0 PG) 29.5
 
  RDW (11.5 - 14.5 %) 13.9
 
  Plt Count (130 - 400 /CUMM) 181
 
  MPV (7.4 - 10.4 FL) 9.6
 
  Gran % (42.2 - 75.2 %) 63.7
 
  Lymphocytes % (20.5 - 51.1 %) 24.9
 
  Monocytes % (1.7 - 9.3 %) 8.2
 
  Eosinophils % (0 - 5 %) 2.5
 
  Basophils % (0.0 - 2.0 %) 0.7
 
  Absolute Granulocytes (1.4 - 6.5 /CUMM) 4.2
 
  Absolute Lymphocytes (1.2 - 3.4 /CUMM) 1.7
 
  Absolute Monocytes (0.10 - 0.60 /CUMM) 0.5
 
  Absolute Eosinophils (0.0 - 0.7 /CUMM) 0.2
 
  Absolute Basophils (0.0 - 0.2 /CUMM) 0
 
  PUBS MCHC (33.0 - 37.0 G/DL) 33.5
 
Toxicology 
 
  Salicylates (0 - 20.0 mg/dL) < 1.0
 
  Acetaminophen (10.0 - 30.0 ug/mL) < 10.0  L
 
  Serum Alcohol (<10 MG/DL) 246.0
 
 
 
 
Diagnostic Data
EKG Results
(06/25/2017) sinus rhythm and otherwise unremarkable.  Slightly faster rate when
compared to previous tracing performed earlier on 06/25/2017.
CXR Results
(06/25/2017): Mild bibasilar atelectasis.
Other Results
Head CT (06/25/2017): 
 
1. No acute intracranial hemorrhage or mass effect.
 
2. Mild prominence of the sulci and mild hypoattenuation of the periventricular 
white matter, which may represent chronic small vessel ischemic disease.
 
If the patient's symptomatology continues an MRI scan could be considered if 
patient is an appropriate candidate and there are no contraindications to MRI.
 
 
Assessment/Plan
Assessment/Plan
62-y-o-w-m w/ hx tobacco use (60 pk-yr d/c'd x 12 yrs), COPD, PUD, HTN, HLD, ? 
pre-DM, and very strong FH for CAD who presented 06/25/2017 after becoming 
unresponsive following a morning and early afternoon of binge drinking (10-12 
beers; multiple shots of liquor) who recalls experiencing anterior chest 
"heaviness" of 5/10 intensity without radiation or associated symptoms  that 
lasted ~20 minutes before subsiding.  
 
Not surprisingly, his cardiac enzymes reveal no evidence of myocardial necrosis,
given the brief period of time the chest discomfort was present and it is 
reassuring that he has had no acute electrocardiographic changes.
 
Nonetheless, he has multiple risk factors for coronary artery disease and should
have an imaging stress test performed to exclude significant ischemia.
 
An echocardiogram would also be appropriate to assess his left ventricular 
systolic/diastolic function, degree of left ventricular hypertrophy, right 
ventricular function, estimated PA pressure, etc.
 
Recommendations:
 
* Telemetry admission, follow-up troponins as have been done, and follow-up 
electrocardiogram.
 
* Schedule for an echocardiogram.
 
* Scheduled for an imaging nuclear stress test.
 
* Continue on his present and hypertensive regimen (ACE inhibitor).
 
* Avoid ASA given history of PUD.
 
* Renal ultrasound to check kidney size.
 
* Consider nephrology consultation.
 
* Continue to hydrate with IV fluids.
 
* DVT prophylaxis.
 
* Psychiatric counseling for alcohol abuse.
 
* EtOH withdrawal prophylaxis.
 
 Further recommendations will follow,
 
 Thank you.
 
 
 
 
 
 
 
Consult Acknowledgment
- Thank you for your consult request.

## 2017-06-26 NOTE — PN- HOUSESTAFF
KLAUS HARRISON 17 1045:
Subjective
Follow-up For:
AMS
Alcohol abuse
Chest pain
Acute kidney injury
Subjective:
This morning patient is alert, awake and oriented.  He is complaining of 
breathing heavily.  He is on room air saturating 93%.  Denies any chest pain or 
discomfort, dizziness or lightheadedness, nausea, vomiting, abdominal pain, 
urinary symptoms, diarrhea or constipation.
 
Review of Systems
Constitutional:
Reports: see HPI. 
 
Objective
Last 24 Hrs of Vital Signs/I&O
 Vital Signs
 
 
Date Time Temp Pulse Resp B/P B/P Pulse O2 O2 Flow FiO2
 
     Mean Ox Delivery Rate 
 
 1004       Room Air  
 
 0921      93 Room Air  
 
 0801 97.9 81 14 156/90  94 Room Air  
 
 213 97.8 81 20 130/80  95   
 
 2130       Room Air  
 
2026 98.6 78 18 118/73     
 
 202 98.6 78 18 118/73  97 Room Air  
 
 1830 98.0 76 16 114/76     
 
 1806      97 Room Air  
 
 1802  84 16 114/76  98 Room Air  
 
 1711 98.8 68 17 91/55  94 Room Air  
 
 1629  80 18 122/58  95 Nasal 2.0L 
 
       Cannula  
 
 1535      95 Nasal 2.0L 
 
       Cannula  
 
 1516  81 26 133/71  95 Nasal 3.0L 
 
       Cannula  
 
 1454 98.7 78 16 157/81  96 Room Air  
 
 
 Intake & Output
 
 
  1600  0800  0000
 
Intake Total  1375 0
 
Output Total   400
 
Balance  1375 -400
 
    
 
Intake, IV  875 0
 
Intake, Oral  500 0
 
Number  0 0
 
Bowel   
 
Movements   
 
Output, Urine   400
 
Patient   150 lb
 
Weight   
 
Weight   Reported by Patient
 
Measurement   
 
Method   
 
 
 
 
Physical Exam
General Appearance: Alert, Oriented X3, Cooperative, No Acute Distress
Neck: Supple
Cardiovascular: tachy
Lungs: Clear to Auscultation
Abdomen: Normal Bowel Sounds, Soft, No Tenderness
Extremities: No Edema
Current Medications:
 Current Medications
 
 
  Sig/Tomi Start time  Last
 
Medication Dose Route Stop Time Status Admin
 
Acetaminophen 650 MG Q6P PRN  2230 AC 
 
  PO   
 
Albuterol Sulfate 3 ML BID  1000 AC 
 
  INH   0920
 
Albuterol Sulfate 3 ML Q4P PRN  0815 AC 
 
  INH   
 
Aspirin Buffered 81 MG DAILY  1000 AC 
 
  PO   0905
 
Calcium Carbonate 500 MG ONCE ONE  2330 DC 
 
  PO  2331  2356
 
Cyanocobalamin/ 1 BAG ONCE ONE  2230 DC 
 
Thiamine/Pyridoxine  IV  0629  0022
 
Dextrose/Water 1,000 ML    
 
Folic Acid 1 MG DAILY  1000 AC 
 
  PO   
 
Ibuprofen 600 MG Q6P PRN  2230 DC 
 
  PO   
 
Lisinopril 20 MG DAILY  1000 CAN 
 
  PO   
 
Lorazepam 2 MG Q6  0600 AC 
 
  PO   06
 
Lorazepam See Dose Q1P PRN  2230 AC 
 
 Insts (1) IV   
 
Morphine Sulfate 4 MG Q4P PRN  223 DC 
 
  IV   
 
Multivitamins 1 TAB DAILY  1000 AC 
 
  PO   
 
Naloxone HCl 1 MG ONCE ONE  1515 DC 
 
  IV  1516  1500
 
Naloxone HCl 0 .STK-MED ONE  1505 DC 
 
  .ROUTE   
 
Omeprazole 40 MG DAILY  1000 DC 
 
  PO   604
 
Omeprazole 40 MG DAILY AC  0700 AC 
 
  PO   
 
Sodium Chloride 1,000 ML Q13H  1000 AC 
 
  IV   0905
 
Sodium Chloride 1,000 ML BOLUS ONE  1515 DC 
 
  IV  1714  1534
 
Thiamine HCl 100 MG DAILY  1000 AC 
 
  PO   
 
 
Dose Instructions:
(1)Lorazepam:
        See Admin Criteria
 
 
 
Last 24 Hrs of Lab/Jake Results
Last 24 Hrs of Labs/Mics:
 Laboratory Tests
 
17 0635:
Anion Gap 7, Estimated GFR 39  L, BUN/Creatinine Ratio 15.0, Creatine Kinase 131
, CBC w Diff NO MAN DIFF REQ, RBC 3.83  L, MCV 89.6, MCH 29.9, RDW 14.3, MPV 9.9
, Gran % 68.0, Lymphocytes % 19.1  L, Monocytes % 9.3, Eosinophils % 2.9, 
Basophils % 0.7, Absolute Granulocytes 3.7, Absolute Lymphocytes 1.0  L, 
Absolute Monocytes 0.5, Absolute Eosinophils 0.2, Absolute Basophils 0, PUBS 
MCHC 33.3
 
17 2258:
Troponin I < 0.01
 
17 1630:
Urinalysis LIGHT  H, Urine Color STRAW, Urine Clarity CLEAR, Urine pH 6.0, Ur 
Specific Gravity 1.020, Urine Protein 100  H, Urine Ketones NEG, Urine Nitrite 
NEG, Urine Bilirubin NEG, Urine Urobilinogen 0.2, Ur Leukocyte Esterase NEG, Ur 
Microscopic SEDIMENT EXAMINED, Urine RBC 15-25  H, Urine WBC 5-10  H, Ur 
Epithelial Cells FEW, Urine Mucus FEW, Urine Hemoglobin LARGE  H, Urine Glucose 
NEG
 
17 1630:
Urine Opiates Screen < 100.00, Methadone Screen < 40, Barbiturate Screen < 60, 
Ur Phencyclidine Scrn < 6.00, Amphetamines Screen < 100, U Benzodiazepines Scrn 
85, Urine Cocaine Screen > 1000  H, Urine Cannabis Screen 66.00  H, Ur Random 
Creatinine 46.1, Ur Random Sodium 76, Ur Random Potassium 11.7, Fraction Sodium 
Excret 2.7  H
 
17 1520:
Anion Gap 13, Estimated GFR 29  L, BUN/Creatinine Ratio 14.8, Glucose 94, 
Hemoglobin A1c Pending, Calcium 9.2, Magnesium 2.4  H, Iron 99, TIBC 315, 
Ferritin 89.1, Total Bilirubin 0.3, AST 19, ALT 25, Alkaline Phosphatase 58, 
Creatine Kinase 209  H, Troponin I < 0.01, Total Protein 6.7, Albumin 3.9, 
Globulin 2.8, Albumin/Globulin Ratio 1.4, CBC w Diff NO MAN DIFF REQ, RBC 3.97  
L, MCV 88.2, MCH 29.5, RDW 13.9, MPV 9.6, Gran % 63.7, Lymphocytes % 24.9, 
Monocytes % 8.2, Eosinophils % 2.5, Basophils % 0.7, Absolute Granulocytes 4.2, 
Absolute Lymphocytes 1.7, Absolute Monocytes 0.5, Absolute Eosinophils 0.2, 
Absolute Basophils 0, PUBS MCHC 33.5, Salicylates < 1.0, Acetaminophen < 10.0  L
, Serum Alcohol 246.0
 
 
Orders
CIWA Score (last 24 hrs):
0-1
 
Assessment/Plan
Assessment:
History 62-year-old man with past medical history of hypertension, COPD, colonic
polyps and hemorrhoids.
 
Problem list
1.  Syncopal episode.  Most likely due to alcohol and substance abuse vs ? 
cardiac
2.  Chest pain. ? ACS.  Negative EKGs and troponins 2
3.  Alcohol and substnace abuse.  On CIWA protocol and Ativan (scheduled and PRN
)
4.  Acute kidney injury. FENa 2.7. likely ATN.  Improving.  BUN/creatinine 34/
2.3 on admission.  This morning 27/1.8
5.  History of colonic polyps and hemorrhoids.  Colonoscopy every 3 years.  Last
colonoscopy on 2016.
6.  History of BRBPR. last 2 days ago. 
7.  History of anemia.  H&H 11.4/34.3 today.
8.  Sexton's esophagus/ Mild erosive gastritis and nonerosive duodenitis. Next 
EGD in 2017.
 
PLAN
* Monitor vitals closely
* Keep electrolytes within normal range
* Continue CIWA protocol and Ativan as needed per CIWA
* Continue scheduled Ativan and taper down 25% daily per CIWA
* Continue multivitamins/thiamine/folic acid
* Continue TRC nebs
* Echocardiogram to rule out cardiomyopathy
* Monitor kidney functions daily
* Avoid nephrotoxins 
* gentle IV fluids for now
* Lisinopril on hold because of a DENISE
* Continue PPI
* Subcutaneous heparin for DVT prophylaxis
* fULL CODE
Problem List:
 1. DENISE (acute kidney injury)
 
 2. chest pain
 
Pain Ratin
Pain Location:
none
Pain Goal: Pain 4 or less
Pain Plan:
tylenol
Tomorrow's Labs & Rationales:
bep
DVT/Prophylaxis: pharmacological
 
 
CHRIS ARREAGA MD 17 1429:
Attending MD Review Statement
 
Attending Statement
Attending MD Statement: examined this patient, discuss w/resident/PA/NP, agreed 
w/resident/PA/NP, reviewed EMR data (avail), discussed with nursing, discussed 
with case mgmt
Attending Assessment/Plan:
62-year-old male with past medical history of hypertension, COPD, alcohol use 
and active cocaine use who is here with DENISE, period of unresponsiveness that was
preceded by chest pain.  At this point he is stable with the DENISE resolving 
nicely with hydration.  We have the ace on hold.  We have an echo pending and 
given the chest pain that occurred before the syncope and the cocaine use will 
call cardiology to see him.  We'll also put in a social work consult for the 
alcohol use and we have him on the CIWA protocol and will follow closely.

## 2017-06-26 NOTE — ADMISSION CERTIFICATION
Admission Certification
 
Certification Statement
- As attending physician, I certify that at the time of
- admission, based on clinical presentation, severity of
- symptoms, need for further diagnostic testing and
- therapeutic interventions, and risk of adverse outcomes
- without in-hospital treatment, in my clinical assessment,
- this patient requires an acute hospital stay for a minimum
- of two nights or longer. I have also considered psychsocial
- factors such as support system, advanced age, financial
- issues, cognitive issues, and failed out-patient treatments,
- past re-admission history, safety of patient, and lack of
- compliance as applicable.
Specific rationale supporting this admission is:
syncope, acute kidney injury

## 2017-06-27 VITALS — SYSTOLIC BLOOD PRESSURE: 162 MMHG | DIASTOLIC BLOOD PRESSURE: 92 MMHG

## 2017-06-27 VITALS — SYSTOLIC BLOOD PRESSURE: 138 MMHG | DIASTOLIC BLOOD PRESSURE: 66 MMHG

## 2017-06-27 VITALS — DIASTOLIC BLOOD PRESSURE: 82 MMHG | SYSTOLIC BLOOD PRESSURE: 158 MMHG

## 2017-06-27 VITALS — DIASTOLIC BLOOD PRESSURE: 90 MMHG | SYSTOLIC BLOOD PRESSURE: 140 MMHG

## 2017-06-27 VITALS — SYSTOLIC BLOOD PRESSURE: 148 MMHG | DIASTOLIC BLOOD PRESSURE: 90 MMHG

## 2017-06-27 NOTE — PN- ATT ADDEND
Attending Addendum
Attending Brief Note
Patient seen and examined.  Plan of care discussed with the medical team and the
patient.  Available lab work and radiology test reports were reviewed.  Patient 
has no specific new complaints today.  He no longer has any chest pressure or 
pain.  Denies any recent fever chills or difficulty breathing.
 Vital Signs
 
 
Date Time Temp Pulse Resp B/P B/P Pulse O2 O2 Flow FiO2
 
     Mean Ox Delivery Rate 
 
06/27 1425 97.1 101 18 158/82  95 Room Air  
 
06/27 1140      96 Room Air  
 
06/27 0928  84  162/92     
 
06/27 0800       Room Air  
 
06/27 0657 97.0 84 18 162/92  93 Room Air  
 
06/27 0137 97.9 90 18 138/66  90 Room Air  
 
06/26 1850      94 Room Air  
 
06/26 1800 98.0 90 18 140/80     
 
06/26 1640 98.0 90 18 140/80  93 Room Air  
 
06/26 1600 98.0 90 18 140/80     
 
 
 Intake & Output
 
 
 06/27 1600 06/27 0800 06/27 0000
 
Intake Total 650 525 765
 
Output Total   
 
Balance 650 525 765
 
    
 
Intake, IV  525 525
 
Intake, Oral 650  240
 
 
   
Exam:
General: Patient awake alert oriented without any distress 
CVS: S1 plus S2 without any murmur or gallops 
Chest: Few scattered crepitation without any wheeze.  There is no respiratory 
distress. 
Abdomen: Soft nontender, bowel sound present, no guarding or rebound 
CNS: Awake alert oriented without any focal neuro deficit and follows command  
appropriately 
Extremities: No edema; no clubbing or cyanosis noted 
 Laboratory Tests
 
 
 06/27
 
 0650
 
Chemistry 
 
  Sodium (137 - 145 mmol/L) 141
 
  Potassium (3.5 - 5.1 mmol/L) 4.3
 
  Chloride (98 - 107 mmol/L) 110  H
 
  Carbon Dioxide (22 - 30 mmol/L) 24
 
  Anion Gap (5 - 16) 7
 
  BUN (9 - 20 mg/dL) 23  H
 
  Creatinine (0.7 - 1.2 mg/dL) 1.9  H
 
  Estimated GFR (>60 ml/min) 36  L
 
  BUN/Creatinine Ratio (7 - 25 %) 12.1
 
 
Kidney ultrasound was within normal limits.
 
Assessment
1.  Syncopal episode.  Most likely due to alcohol and substance abuse vs ? 
cardiac; no evidence of arrhythmia
2.  Chest pain. ? ACS.  Negative EKGs and troponins 2
3.  Alcohol and substnace abuse.  On CIWA protocol and Ativan (scheduled and PRN
). CIWA 0-2
4.  Acute kidney injury. FENa 2.7. likely ATN.  Improving.  BUN/creatinine 34/
2.3 on admission.   Renal ultrasound normal
5.  History of colonic polyps and hemorrhoids.  Colonoscopy every 3 years.  Last
colonoscopy on September 2016.
6.  History of BRBPR. last 2 days prior to admission
7.  History of anemia. 
8.  Sexton's esophagus/ Mild erosive gastritis and nonerosive duodenitis. Next 
EGD in September 2017.
9.  Hypertension.  Lisinopril on hold because of acute kidney injury plan
 
Plan
* Await echocardiogram
* Continue IV fluids
* Encourage oral liquids
* Repeat creatinine and BUN tomorrow
* Increase activity
* Possible discharge tomorrow

## 2017-06-27 NOTE — PN- HOUSESTAFF
Subjective
Follow-up For:
Syncope/chest pain
Alcohol abuse
Acute kidney injury
Tele-Events Since Last Visit:
Note tele events
Subjective:
He is alert, awake and oriented.  He is complaining of dull mid chest pain that 
is nonradiating.  Pain comes in when he moves side-to-side.  No chest pain or 
discomfort on deep inspiration.  No episode of bloody stool.  He is eating 
drinking okay.  He is going in medication on Friday so wants to get discharge 
tomorrow.
 
Review of Systems
Constitutional:
Reports: see HPI. 
 
Objective
Last 24 Hrs of Vital Signs/I&O
 Vital Signs
 
 
Date Time Temp Pulse Resp B/P B/P Pulse O2 O2 Flow FiO2
 
     Mean Ox Delivery Rate 
 
 1140      96 Room Air  
 
 0928  84  162/92     
 
 0800       Room Air  
 
 0657 97.0 84 18 162/92  93 Room Air  
 
 0137 97.9 90 18 138/66  90 Room Air  
 
 1850      94 Room Air  
 
 1800 98.0 90 18 140/80     
 
 1640 98.0 90 18 140/80  93 Room Air  
 
 1600 98.0 90 18 140/80     
 
 
 Intake & Output
 
 
  1600  0800  0000
 
Intake Total  525 765
 
Output Total   
 
Balance  525 765
 
    
 
Intake, IV  525 525
 
Intake, Oral   240
 
 
 
 
Physical Exam
General Appearance: Alert, Oriented X3, Cooperative, No Acute Distress
Neck: Supple
Cardiovascular: Regular Rate, No Murmurs
Lungs: Clear to Auscultation
Abdomen: Normal Bowel Sounds, Soft, No Tenderness
Extremities: No Edema
Current Medications:
 Current Medications
 
 
  Sig/Tomi Start time  Last
 
Medication Dose Route Stop Time Status Admin
 
Acetaminophen 650 MG Q6P PRN  2230 AC 
 
  PO   
 
Albuterol Sulfate 3 ML BID  1000 AC 
 
  INH   1137
 
Albuterol Sulfate 3 ML Q4P PRN  0815 AC 
 
  INH   
 
Amlodipine Besylate 5 MG ONCE ONE  0830 DC 
 
  PO  0831  0928
 
Aspirin Buffered 81 MG DAILY  1000 DC 
 
  PO   0905
 
Clonidine 0.1 MG TID PRN  1045 AC 
 
  PO   
 
Folic Acid 1 MG DAILY  1000 AC 
 
  PO   0928
 
Heparin Sodium  5,000 UNIT Q8  1400 AC 
 
(Porcine)  SC   0641
 
Lorazepam 1.5 MG Q8  1400 AC 
 
  PO   
 
Lorazepam 2 MG Q6  0600 DC 
 
  PO   0641
 
Lorazepam See Dose Q1P PRN  2230 AC 
 
 Insts (1) IV   
 
Multivitamins 1 TAB DAILY  1000 AC 
 
  PO   0928
 
Omeprazole 40 MG DAILY AC  0700 AC 
 
  PO   0641
 
Polyethylene Glycol 17 GM DAILY  1000 AC 
 
  PO   
 
Senna/Docusate Sodium 1 TAB BID  1000 AC 
 
  PO   1129
 
Sodium Chloride 1,000 ML Q13H  1000 AC 
 
  IV   2142
 
Thiamine HCl 100 MG DAILY  1000 AC 
 
  PO   0928
 
 
Dose Instructions:
(1)Lorazepam:
        See Admin Criteria
 
 
 
Last 24 Hrs of Lab/Jake Results
Last 24 Hrs of Labs/Mics:
 Laboratory Tests
 
17 0650:
Anion Gap 7, Estimated GFR 36  L, BUN/Creatinine Ratio 12.1
 
 
Assessment/Plan
Assessment:
History 62-year-old man with past medical history of hypertension, COPD, colonic
polyps and hemorrhoids.
 
Problem list
1.  Syncopal episode.  Most likely due to alcohol and substance abuse vs ? 
cardiac
2.  Chest pain. ? ACS.  Negative EKGs and troponins 2
3.  Alcohol and substnace abuse.  On CIWA protocol and Ativan (scheduled and PRN
). CIWA 0-2
4.  Acute kidney injury. FENa 2.7. likely ATN.  Improving.  BUN/creatinine 34/
2.3 on admission.  This morning 23/1.9.  Renal ultrasound normal
5.  History of colonic polyps and hemorrhoids.  Colonoscopy every 3 years.  Last
colonoscopy on 2016.
6.  History of BRBPR. last 2 days prior to admission
7.  History of anemia. 
8.  Sexton's esophagus/ Mild erosive gastritis and nonerosive duodenitis. Next 
EGD in 2017.
9.  Hypertension.  Lisinopril on hold because of acute kidney injury
 
PLAN
* Monitor vitals closely
* Keep electrolytes within normal range
* Continue CIWA protocol and Ativan as needed per CIWA
* Continue scheduled Ativan and taper down to 1.5 mg every 8 hours
* Continue multivitamins/thiamine/folic acid
* Continue TRC nebs
* Echocardiogram to rule out cardiomyopathy
* Cardio consult appreciated.  Scheduled stress test as an outpatient
* Monitor kidney functions daily
* Avoid nephrotoxins 
* Continue gentle IV fluids
* Lisinopril on hold because of a DENISE
* Will start him on clonidine 0.1 mg 3 times a day as needed
* Continue PPI
* Social work consult
* Subcutaneous heparin for DVT prophylaxis
* FULL CODE
Problem List:
 1. DENISE (acute kidney injury)
 
 2. Alcohol intoxication
 
 3. chest pain
 
Pain Ratin
Pain Location:
chest
Pain Goal: Pain 4 or less
Pain Plan:
tylenol
Tomorrow's Labs & Rationales:
bep
DVT/Prophylaxis: pharmacological

## 2017-06-27 NOTE — ULTRASOUND REPORT
EXAMINATION:
US RETROPERITONEAL COMPLETE (RENAL)
 
CLINICAL INFORMATION:
Evaluate underlying renal disease. Patient states history of kidney stone
approximately 30 years ago.
 
COMPARISON:
None
 
TECHNIQUE:
Real-time imaging of the kidneys and bladder.
 
FINDINGS:
 
RIGHT KIDNEY: 11.4 x 6.0 x 6.1 cm (SAG x AP x TRV). The kidney is normal in
size, contour, and echogenicity. Renal cortical thickness is normal. No
calculi or focal parenchymal lesions. No hydronephrosis.
 
LEFT KIDNEY: 12.0 x 6.2 x 4.9 cm (SAG x AP x TRV). The kidney is normal in
size, contour, and echogenicity. Renal cortical thickness is normal. No
calculi or focal parenchymal lesions. No hydronephrosis.
 
BLADDER: Well-distended and normal. Bilateral ureteral jets are demonstrated.
Prevoid bladder volume is 182 mL. Postvoid bladder volume is 34 mL.
 
PROSTATE GLAND: Heterogeneous and borderline enlarged, measuring 3.9 x 3.7 x
4.9 cm.
 
IMPRESSION:
Normal renal ultrasound.

## 2017-06-28 VITALS — SYSTOLIC BLOOD PRESSURE: 140 MMHG | DIASTOLIC BLOOD PRESSURE: 80 MMHG

## 2017-06-28 VITALS — SYSTOLIC BLOOD PRESSURE: 150 MMHG | DIASTOLIC BLOOD PRESSURE: 82 MMHG

## 2017-06-28 VITALS — SYSTOLIC BLOOD PRESSURE: 150 MMHG | DIASTOLIC BLOOD PRESSURE: 88 MMHG

## 2017-06-28 VITALS — SYSTOLIC BLOOD PRESSURE: 130 MMHG | DIASTOLIC BLOOD PRESSURE: 80 MMHG

## 2017-06-28 NOTE — PN- ATT ADDEND
Attending Addendum
Attending Brief Note
Patient seen and examined.  Plan of care discussed with the medical team and the
patient.  Available lab work and radiology test reports were reviewed.  Patient 
has no specific new complaints today.  He no longer has any chest pressure or 
pain.  Denies any recent fever chills or difficulty breathing. Pt is going for 
stress test. 
 Vital Signs
 
 
Date Time Temp Pulse Resp B/P B/P Pulse O2 O2 Flow FiO2
 
     Mean Ox Delivery Rate 
 
06/28 0648 97.7 89 20 150/88  96 Room Air  
 
06/28 0000       Room Air  
 
06/27 2154 98.0 68 20 140/90  96 Room Air  
 
06/27 1902      95 Room Air Room Air 
 
06/27 1800 97.1 94 18 148/90     
 
06/27 1634  94  148/90     
 
06/27 1634  94 20 148/90     
 
06/27 1600 97.1 101 18 158/82     
 
06/27 1425 97.1 101 18 158/82  95 Room Air  
 
06/27 1140      96 Room Air  
 
06/27 0928  84  162/92     
 
 
 Intake & Output
 
 
 06/28 1600 06/28 0800 06/28 0000
 
Intake Total  525 882
 
Output Total   
 
Balance  525 882
 
    
 
Intake, IV  400 360
 
Intake, Oral  125 522
 
 
Exam:
General: Patient awake alert oriented without any distress 
CVS: S1 plus S2 without any murmur or gallops 
Chest: Few scattered crepitation without any wheeze.  There is no respiratory 
distress. 
Abdomen: Soft nontender, bowel sound present, no guarding or rebound 
CNS: Awake alert oriented without any focal neuro deficit and follows command  
appropriately 
Extremities: No edema; no clubbing or cyanosis noted 
 Laboratory Tests
 
 
 06/28
 
 0641
 
Chemistry 
 
  Sodium (137 - 145 mmol/L) 140
 
  Potassium (3.5 - 5.1 mmol/L) 4.3
 
  Chloride (98 - 107 mmol/L) 108  H
 
  Carbon Dioxide (22 - 30 mmol/L) 25
 
  Anion Gap (5 - 16) 7
 
  BUN (9 - 20 mg/dL) 21  H
 
  Creatinine (0.7 - 1.2 mg/dL) 1.8  H
 
  Estimated GFR (>60 ml/min) 39  L
 
  BUN/Creatinine Ratio (7 - 25 %) 11.7
 
 
Assessment
1.  Syncopal episode.  Most likely due to alcohol and substance abuse vs ? 
cardiac; no evidence of arrhythmia
2.  Chest pain. ? ACS.  Negative EKGs and troponins 2
3.  Alcohol and substnace abuse.  On CIWA protocol and Ativan (scheduled and PRN
). CIWA 0-2
4.  Acute kidney injury. FENa 2.7. likely ATN.  Improving.  BUN/creatinine 34/
2.3 on admission.   Renal ultrasound normal
5.  History of colonic polyps and hemorrhoids.  Colonoscopy every 3 years.  Last
colonoscopy on September 2016.
6.  History of BRBPR. last 2 days prior to admission
7.  History of anemia. 
8.  Sexton's esophagus/ Mild erosive gastritis and nonerosive duodenitis. Next 
EGD in September 2017.
9.  Hypertension.  Lisinopril on hold because of acute kidney injury plan
10.  suspected suicidal attempt - patient apparently has made statements to 
nursing staff alluding to suicidal attempt
 
Plan
* Await echocardiogram
* Stress test today 
* Continue IV fluids
* Encourage oral liquids
* Repeat creatinine and BUN tomorrow
* Increase activity
* psych consult today for possible suicidal ideation and attempt
* Possible discharge tomorrow

## 2017-06-28 NOTE — ECHOCARDIOGRAM REPORT
PARISH YOUNG 
 
 Age:    62     :    1955      Gender:     M 
 
 MRN:    [id number] 
 
 Exam Date:     2017  
                18:33 
 
 Exam Location:   
 [place] 
 
 Ht (in):     67      Wt (lb):      150     BSA:    1.80 
 
 BP:          162     /     92 
 
 Ordering Physician:        KLAUS HARRISON MD 
 
 Referring Physician:       Victor M Adan MD 
 
 Technologist:              Sheela Owusu [facility] 
 
 Room Number:               182 
 
 Indications:       CHEST PAIN, CARDIOMYOPATHY 
 
 Rhythm:                 Sinus 
 
 Technical Quality:      Fair 
 
 FINDINGS 
 
 Left Ventricle 
 Normal size left ventricle. Mild concentric left ventricular  
 hypertrophy. No obvious regional wall motion abnormalities. Normal  
 left ventricular ejection fraction visually estimated at >55%.  
 Abnormal relaxation filling pattern of the left ventricle for age  
 (stage 1 diastolic dysfunction). 
 
 Right Ventricle 
 Normal right ventricular size and function. 
 
 Right Atrium 
 Normal right atrial size. 
 
 Left Atrium 
 Normal left atrial size. 
 
 Mitral Valve 
 Mild mitral annular calcification. Mitral valve mildly thickened.  
 Trace mitral regurgitation. 
 
 Aortic Valve 
 Trileaflet aortic valve.  Mild aortic sclerosis.  No aortic valve  
 stenosis or regurgitation. 
 
 Tricuspid Valve 
 Structurally normal tricuspid valve. No tricuspid regurgitation.  
 Unable to estimate the right ventricular systolic pressure. 
 
 Pulmonic Valve 
 Pulmonic valve not well visualized, grossly normal. No pulmonic  
 regurgitation. 
 
 Pericardium 
 No pericardial effusion. 
 
 Great Vessels 
 Normal size aortic root.  Normal size inferior vena cava. 
 
 CONCLUSIONS 
 Normal size left ventricle. 
 Mild concentric left ventricular hypertrophy. 
 Normal left ventricular ejection fraction visually estimated at > 
 55%. 
 Abnormal relaxation filling pattern of the left ventricle for age  
 (stage 1 diastolic dysfunction). 
 Normal right ventricular size and function. 
 Normal atrial size. 
 Trace mitral regurgitation. 
 Unable to estimate the right ventricular systolic pressure. 
 
 Victor M Adan M.D. 
 (Electronically Signed) 
 Final Date:      2017  
                  17:51 
 
 MEASUREMENTS  (Male / Female) Normal Values 
 
 2D ECHO 
 LV Diastolic Diameter PLAX        4.5 cm                4.2 - 5.9 / 3.9 - 5.3 
cm 
 LV Systolic Diameter PLAX         3.1 cm                2.1 - 4.0 cm 
 LV Fractional Shortening PLAX     31.1 %                25 - 46  % 
 LV Ejection Fraction 2D Teich     59.0 %                 
 IVS Diastolic Thickness           1.1 cm                 
 LVPW Diastolic Thickness          1.1 cm                 
 LV Relative Wall Thickness        0.5                    
 RV Internal Dim ED PLAX           2.9 cm                1.9 - 3.8 cm 
 LVOT Diameter                     2.3 cm                 
 Aortic Root Diameter              3.2 cm                 
 LA Systolic Diameter LX           3.4 cm                3.0 - 4.0 / 2.7 - 3.8 
cm 
 LA Volume                         34.0 cm              18 - 58 / 22 - 52 cm 
 Ascending Aorta Diameter          3.1 cm                 
 
 DOPPLER 
 AV Peak Velocity                  115.0 cm/s             
 AV Peak Gradient                  5.3 mmHg               
 AV Mean Velocity                  83.7 cm/s              
 AV Mean Gradient                  3.0 mmHg               
 AV Velocity Time Integral         22.2 cm                
 LVOT Peak Velocity                81.9 cm/s              
 LVOT Peak Gradient                2.7 mmHg               
 LVOT Mean Velocity                57.1 cm/s              
 LVOT Mean Gradient                2.0 mmHg               
 LVOT Velocity Time Integral       15.6 cm                
 LVOT Stroke Volume                64.8 cm               
 AV Area Cont Eq vti               2.9 cm                
 AV Area Cont Eq pk                3.0 cm                
 MV Peak Velocity                  98.7 cm/s              
 MV Peak Gradient                  3.9 mmHg               
 MV Mean Velocity                  48.9 cm/s              
 MV Mean Gradient                  1.0 mmHg               
 Mitral E Point Velocity           46.9 cm/s              
 Mitral A Point Velocity           57.4 cm/s              
 Mitral E to A Ratio               0.8                    
 MV PHT Velocity                   55.2 cm/s              
 MV Deceleration Knott             158.0 cm/s            
 MV Pressure Half Time             104.8 ms               
 MV Area PHT                       2.1 cm                
 MV Deceleration Time              198.0 ms               
 PV Peak Velocity                  80.5 cm/s              
 PV Peak Gradient                  2.6 mmHg               
 PV Mean Velocity                  55.0 cm/s              
 PV Mean Gradient                  1.0 mmHg               
 PV Velocity Time Integral         13.2 cm                
 LV E' Lateral Velocity            8.8 cm/s               
 Mitral E to LV E' Lateral Ratio   5.3                    
 LV E' Septal Velocity             7.2 cm/s               
 Mitral E to LV E' Septal Ratio    6.5

## 2017-06-28 NOTE — PN- CARDIOLOGY
Assessment/Plan
Assessment/Plan
62-y-o-w-m w/ hx tobacco use (60 pk-yr d/c'd x 12 yrs), COPD, PUD, HTN, HLD, ? 
pre-DM, and very strong FH for CAD who presented 06/25/2017 after becoming 
unresponsive following a morning and early afternoon of binge drinking (10-12 
beers; multiple shots of liquor) who recalls experiencing anterior chest 
"heaviness" of 5/10 intensity without radiation or associated symptoms  that 
lasted ~20 minutes before subsiding.  
 
Not surprisingly, his cardiac enzymes reveal no evidence of myocardial necrosis,
given the brief period of time the chest discomfort was present and it is 
reassuring that he has had no acute electrocardiographic changes.
 
* Multiple risk factors for coronary artery disease and imaging stress test 
performed this a.m. with poor functional capacity secondary to his COPD and 
submaximal heart rate achieved.  Nuclear images pending.
 
* Follow-up on echocardiogram.
 
* Continue DVT prophylaxis.
 
 
 
Continue telemetry? Yes

## 2017-06-28 NOTE — CONS- PSYCHIATRY
Psychiatric Consult
Date of Consult: 06/28/17
Reason for Consult:
"Suicidal ideation, alcohol abuse"
History of Present Illness:
Identifying Info:   
62-year-old   male presents to [facility] emergency 
department on 06/25/2017 by ambulance intoxicated.  At time of admission 
, detox positive for cannabis and cocaine.  Admitted to medicine due to acute 
kidney injury.
 
CC:
"I've been depressed lately... Mad more easily."
 
HPI:
Per nursing documentation patient made concerning statement to nursing staff 
regarding attempting killing himself on purpose with alcohol.  At that time the 
patient denied current suicidal ideation or suicidal plan.  The patient reports 
he's been increasingly depressed over the past 5 years and he is very quick to 
anger.  He denies any desire to die or kill himself or any previous attempts.   
He reports he is a "social drinker and typically consumes between 8-10 drinks 
including beer and shots when drinking.  A few times a week and he never drinks 
at home.  He denies any previous withdrawal from alcohol or seizure.
 
He reports he had several losses as a child including deaths of family members 
which weighs heavily on him.
 
At present he is receptive to considering individual therapy once he returns 
from vacation in [place].  He declines any medication for mood or alcohol 
cravings at this time but would consider in the future.  He declines intensive 
outpatient treatment and is adamantly against any treatment plan that would 
include group therapy.
 
PMH: Please see the H&P for a complete listing
Hypertension, hyperlipidemia, emphysema, peptic ulcer disease
 
Past Psych History:   
Denies
 
Family Psych History:
Brother - ?PSTD/Depression post fighting in [place] 
 
Substance History
Per chart alcohol, cocaine, and marijuana use.  Patient only endorses alcohol 
use to this writer
 
-Treatment
Denies
 
Family Substance History:
Brother - alcoholic in recovery
 
Social:
.  Currently unemployed .  Has 2 adult children.
 
Abuse/Trauma:
Death of his father at age 6.
 
Current Home Psychotropic Medications: 
Denies
 
Current Hospital Psychotropic Medications: 
 Med
 
 
Lorazepam  IV Q1P PRN 06/25/17 2230
 
Lorazepam 1 MG PO BID 06/28/17 1800
 
 
Allergies:
Coded Allergies:
venom-honey bee (bee venom (honey bee)) (ANAPHYLAXIS 06/25/17)
morphine (VOMITING 05/19/16)
 
Current Medications:
 Current Medications
 
 
  Sig/Tomi Start time  Last
 
Medication Dose Route Stop Time Status Admin
 
Acetaminophen 650 MG Q6P PRN 06/25 2230 AC 
 
  PO   
 
Albuterol Sulfate 3 ML BID 06/26 1000 AC 06/28
 
  INH   0839
 
Albuterol Sulfate 3 ML Q4P PRN 06/26 0815 AC 
 
  INH   
 
Clonidine 0.1 MG TID PRN 06/27 1045 AC 06/27
 
  PO   1634
 
Folic Acid 1 MG DAILY 06/27 1000 AC 06/28
 
  PO   1140
 
Heparin Sodium  5,000 UNIT Q8 06/26 1400 AC 06/28
 
(Porcine)  SC   1416
 
Lorazepam 1 MG BID 06/28 1800 CAN 
 
  PO   
 
Lorazepam 1 MG BID 06/28 1800 AC 
 
  PO   
 
Lorazepam 1 MG Q12 06/28 1000 DC 
 
  PO   
 
Lorazepam 1.5 MG Q8 06/27 1400 DC 06/28
 
  PO   0602
 
Lorazepam See Dose Q1P PRN 06/25 2230 AC 
 
 Insts (1) IV   
 
Multivitamins 1 TAB DAILY 06/27 1000 AC 06/28
 
  PO   1140
 
Omeprazole 40 MG DAILY AC 06/26 0700 AC 06/27
 
  PO   0641
 
Patient Medication  1 ED .STK-MED ONE 06/28 1345 DC 
 
Teaching  ED 06/28 1346  
 
Polyethylene Glycol 17 GM DAILY 06/27 1000 AC 
 
  PO   
 
Senna/Docusate Sodium 1 TAB BID 06/27 1000 AC 06/27
 
  PO   2124
 
Sodium Chloride 1,000 ML Q13H 06/26 1000 AC 06/28
 
  IV   0831
 
Thiamine HCl 100 MG DAILY 06/27 1000 AC 06/28
 
  PO   1140
 
 
Dose Instructions:
(1)Lorazepam:
        See Admin Criteria
 
 
 
Past History
 
Past Medical History
Neurological: NONE
EENT: NONE
Cardiovascular: hypertension, hyperlipidemia
Respiratory: NONE
Gastrointestinal: GERD, peptic ulcer disease
Hepatic: NONE
Renal: NONE
Musculoskeletal: NONE
Psychiatric: NONE
Endocrine: NONE
Blood Disorders: NONE
Cancer(s): NONE
GYN/Reproductive: NONE
 
Past Surgical History
Surgical History: non-contributory
 
Psychosocial History
Strengths/Capabilities:
Family support
Physical Limitations (Interventions):
Chronic illness
 
Psychiatric Treatment History
Psych Treatment
   Psychiatric Treatment No
Diagnosis:
None
Risk Factors: chronic/serious med cond., male
 
Substance Use/Abuse History
Drug Use/Abuse
   Substances Used/Abused Yes
 
Substance Abuse Treatment
Substance Abuse Treatment
   Past Substance Abuse [place] No
 
Assessment/Plan
 
Mental Status
Mental Status Exam:
Presentation/Appearance:  Cooperative with evaluation. Hospital garb. Lying in 
bed
Orientation: x3     
Sensorium:  Awake and alert
Eye contact: Appropriate
Affect: Somewhat blunted but congruent with stated mood
Mood: "Depressed more lately"
Depression:  Endorses 
Anxiety: Endorses 
Thought Content:
- Denies SI/HI, AH/VH, PI. States and also believes they will not kill 
themselves.
- Denies Hopeless/Helpless Thoughts
Thought Process: Linear
Associations: Appropriate
Speech: WNL
Judgment: Poor
Insight: Poor
Cognition:
 Memory: Grossly intact
 Attention/Concentration: Grossly intact
 Fund of Knowledge: Adequate
 Abstractions:Not asssessed
 MMSE: Not asssessed
 
 
 
 
 
Lab Results:
Laboratory Tests
 
06/28/17 0641:
Anion Gap 7, Estimated GFR 39  L, BUN/Creatinine Ratio 11.7
 
06/27/17 0650:
Anion Gap 7, Estimated GFR 36  L, BUN/Creatinine Ratio 12.1
 
06/26/17 0635:
Anion Gap 7, Estimated GFR 39  L, BUN/Creatinine Ratio 15.0, Creatine Kinase 131
, CBC w Diff NO MAN DIFF REQ, RBC 3.83  L, MCV 89.6, MCH 29.9, RDW 14.3, MPV 9.9
, Gran % 68.0, Lymphocytes % 19.1  L, Monocytes % 9.3, Eosinophils % 2.9, 
Basophils % 0.7, Absolute Granulocytes 3.7, Absolute Lymphocytes 1.0  L, 
Absolute Monocytes 0.5, Absolute Eosinophils 0.2, Absolute Basophils 0, PUBS 
MCHC 33.3
 
06/25/17 2258:
Troponin I < 0.01
 
Diffential Diagnosis:
Alcohol use disorder
Rule out cocaine use disorder
Rule out cannabis use disorder
Unspecified mood disorder versus substance induced mood disorder
Impression:
62-year-old   male presents unresponsive in the context of 
polysubstance intoxication.  At present he is minimizing his use.  He does 
endorse problems with mood and anxiety but declines treatment for substance 
issues or psychopharmacological treatment.  He is agreeable to considering 
individual therapy on an outpatient basis.
 
He is not suicidal at this time, denies any previous suicide attempt and is not 
a threat to himself or others at this time.
Provisional Treatment Plan:
1.  Continue CIWA, vitamin supplementation, Ativan taper.
 
2.  We will provide the patient with a list of area individual therapists.
 
3.  Patient provided with contact info for [facility] outpatient 
psychiatric services if he would like to pursue psychiatric medication 
management.  Please include the following in patient discharge instructions for 
reinforcement, "If you would like to pursue medication or group therapy for 
treatment of mental health concerns please call [facility] 
[facility] at [phone] or [facility] Intensive Outpatient 
Program at [phone] to arrange for an intake appointment."
 
4.  Please include the following the patient discharge instructions, "In an 
event of a psychiatric emergency call [phone], or go to nearest emergency 
department."
 
Thank you for including psychiatry in this case we'll only follow on an as-
needed basis.
 
A total of 60 minutes was spent with the patient with more than 50% of the time 
spent in counseling and/or coordination of care.

## 2017-06-28 NOTE — NUR
Referral received Monday via electronic .  This patient is a 62
year old man, admitted to the hospital on 6/25/17 with unresponsiveness.
Patient given narcan;  has had an elevated BAL and +UTox for cocaine and
marijuana.  Met with patient briefly this am.  Awake and alert; had completed
one part of his cardiac stress test and was waiting to be taken for the second
part.  Slightly agitated when the issue of aftercare for substance abuse
treatment is raised; patient acknowledges and exhibits anxiety, and says he
will not participate in any treatment where there are groups.  Case discussed
with BOB Arizmendi.  He has received a referral to see this patient as the
patient told the RN this am that his overdose was a suicide attempt; when I
queried patient he denied same.  Await psychiatric consult.

## 2017-06-28 NOTE — NUCLEAR MEDICINE REPORT
EXERCISE STRESS AND RESTING SPECT MYOCARDIAL PERFUSION IMAGING STUDY WITH
GATED SPECT IMAGES:
 
CLINICAL INDICATION: Hypertension.
 
PROCEDURE: Regional myocardial perfusion was assessed using a 1 day protocol.
Stress images were obtained on 06/28/2017 following the intravenous
administration of 18.7 mCi Tc 99m Myoview. Stress was performed using the
standard Adarsh protocol, with the patient reaching a peak heart rate of 78%
maximal predicted heart rate. Rest images were obtained 06/28/2017 following
the intravenous administration of 30.2 mCi Technetium 99m Myoview.
 
Single photon emission tomographic (SPECT) images were obtained. SPECT images
were acquired in a 64 x 64 matrix of 64 projections over 180 degrees. These
were reconstructed into standard short axis, horizontal and vertical long
axis cardiac projections.
 
FINDINGS: The post stress images demonstrate the left ventricular chamber to
be normal in size. There is a small region of moderately decreased activity
present in the basal inferior wall an in the adjacent basal inferoseptal
wall. This is probably due to attenuation by the adjacent diaphragm, and the
latter can be visualized on review of the acquired raw projections. No other
regions of abnormally decreased activity are noted.
 
The resting images also demonstrate are not significantly changed from the
post stress images.
 
The stress images were obtained using a gated SPECT technique, which permits
visualization of wall motion and calculation of the left ventricular ejection
fraction. No left ventricular wall motion abnormalities are noted on the
stress study.
 
The calculated left ventricular ejection fraction is 47% on the stress study.
 
No previous study is available for comparison.
 
IMPRESSION: Probable normal exercise stress and resting myocardial perfusion
study with normal left ventricular wall motion and ejection fraction.
Decreased activity in the inferior wall is likely due to attenuation by the
adjacent diaphragm. However, because the patient was unable to achieve an
adequate heart rate response, significant myocardial ischemia during exercise
cannot be ruled out.

## 2017-06-28 NOTE — PN- HOUSESTAFF
Subjective
Follow-up For:
Syncope/chest pain
Alcohol abuse
Acute kidney injury
Subjective:
This morning patient is alert, awake and oriented.  He denies any chest pain or 
discomfort.  Reports mild breathing difficulty.  He is nothing by mouth after 
midnight tonight and going for stress test today.  On my evaluation patient 
denies any suicidal ideation, attempt or plan.
 
Review of Systems
Constitutional:
Reports: see HPI. 
 
Objective
Last 24 Hrs of Vital Signs/I&O
 Vital Signs
 
 
Date Time Temp Pulse Resp B/P B/P Pulse O2 O2 Flow FiO2
 
     Mean Ox Delivery Rate 
 
 1115 96.6 94 18 130/80  97 Room Air  
 
 0843      97 Room Air  
 
 0648 97.7 89 20 150/88  96 Room Air  
 
 0000       Room Air  
 
 2154 98.0 68 20 140/90  96 Room Air  
 
 1902      95 Room Air Room Air 
 
 1800 97.1 94 18 148/90     
 
 1634  94  148/90     
 
 1634  94 20 148/90     
 
 1600 97.1 101 18 158/82     
 
 1425 97.1 101 18 158/82  95 Room Air  
 
 
 Intake & Output
 
 
  1600  0800  0000
 
Intake Total  525 882
 
Output Total   
 
Balance  525 882
 
    
 
Intake, IV  400 360
 
Intake, Oral  125 522
 
 
 
 
Physical Exam
General Appearance: Alert, Oriented X3, Cooperative, No Acute Distress
Neck: Supple
Cardiovascular: Regular Rate, No Murmurs
Lungs: Clear to Auscultation
Abdomen: Normal Bowel Sounds, Soft, No Tenderness
Neurological: Normal Speech, Strength at 5/5 X4 Ext, Sensation Intact, Cranial 
Nerves 3-12 NL
Extremities: No Edema
Current Medications:
 Current Medications
 
 
  Sig/Tomi Start time  Last
 
Medication Dose Route Stop Time Status Admin
 
Acetaminophen 650 MG Q6P PRN  2230 AC 
 
  PO   
 
Albuterol Sulfate 3 ML BID  1000 AC 
 
  INH   0839
 
Albuterol Sulfate 3 ML Q4P PRN  0815 AC 
 
  INH   
 
Clonidine 0.1 MG TID PRN  1045 AC 
 
  PO   1634
 
Folic Acid 1 MG DAILY  1000 AC 
 
  PO   1140
 
Heparin Sodium  5,000 UNIT Q8  1400 AC 
 
(Porcine)  SC   0602
 
Lorazepam 1 MG BID  1800 CAN 
 
  PO   
 
Lorazepam 1 MG BID  1800 AC 
 
  PO   
 
Lorazepam 1 MG Q12  1000 DC 
 
  PO   
 
Lorazepam 1.5 MG Q8  1400 DC 
 
  PO   0602
 
Lorazepam See Dose Q1P PRN  2230 AC 
 
 Insts (1) IV   
 
Multivitamins 1 TAB DAILY  1000 AC 
 
  PO   1140
 
Omeprazole 40 MG DAILY AC  0700 AC 
 
  PO   0641
 
Polyethylene Glycol 17 GM DAILY  1000 AC 
 
  PO   
 
Senna/Docusate Sodium 1 TAB BID  1000 AC 
 
  PO   2124
 
Sodium Chloride 1,000 ML Q13H  1000 AC 
 
  IV   0831
 
Thiamine HCl 100 MG DAILY  1000 AC 
 
  PO   1140
 
 
Dose Instructions:
(1)Lorazepam:
        See Admin Criteria
 
 
 
Last 24 Hrs of Lab/Jake Results
Last 24 Hrs of Labs/Mics:
 Laboratory Tests
 
17 0641:
Anion Gap 7, Estimated GFR 39  L, BUN/Creatinine Ratio 11.7
 
 
Assessment/Plan
Assessment:
History 62-year-old man with past medical history of hypertension, COPD, colonic
polyps and hemorrhoids.
 
Problem list
1.  Syncopal episode.  Most likely due to alcohol and substance abuse vs ? 
cardiac
2.  Chest pain. ? ACS.  Negative EKGs and troponins 2
3.  Alcohol and substnace abuse.  On CIWA protocol and Ativan (scheduled and PRN
). 
4.  Acute kidney injury.This morning creatinine is 1.8.  Renal ultrasound normal
5.  History of colonic polyps and hemorrhoids.  Colonoscopy every 3 years.  Last
colonoscopy on 2016.
6.  History of BRBPR. last 2 days prior to admission
7.  History of anemia. 
8.  Sexton's esophagus/ Mild erosive gastritis and nonerosive duodenitis. Next 
EGD in 2017.
9.  Hypertension.  Lisinopril on hold because of acute kidney injury
 
PLAN
* Monitor vitals closely
* Keep electrolytes within normal range
* Continue CIWA protocol and Ativan as needed per CIWA
* Continue scheduled Ativan and taper down to 1 mg BID
* Continue multivitamins/thiamine/folic acid
* Continue TRC nebs
* f/u Echocardiogram 
* Stress test today
* Monitor kidney functions daily
* Avoid nephrotoxins 
* Continue gentle IV fluids
* Lisinopril on hold because of a DENISE
* Continue PPI
* Social work consult
* Psych consult
* Subcutaneous heparin for DVT prophylaxis
* FULL CODE
Problem List:
 1. Altered mental status
 
 2. Alcohol intoxication
 
Pain Ratin
Pain Location:
none
Pain Goal: Pain 4 or less
Pain Plan:
tylenol
Tomorrow's Labs & Rationales:
bep
DVT/Prophylaxis: pharmacological

## 2017-06-28 NOTE — NUR
BACK FROM NUCLEAR MEDICINE. NOTIFIED RESIDENT DR HARRISON #[id number] OF PATIENT BACK ON
UNIT. PATIENT AND FAMILY WOULD LIKE AN UPDATE REGARDING POC. PLACED BACK ON
MONITOR. CALL BELL IN REACH.

## 2017-06-28 NOTE — NUR
DURING BEDSIDE REPORT WITH NIGHT NURSE, ASKED PATIENT IF OVERDOSE WAS
ACCIDENTAL OR INTENTIONAL. PATIENT REPORTED IT WAS INTENTIONAL. ASKED PATIENT
IF PATIENT IS SUICIDAL NOW AND IF HE HAS A PLAN. PATIENT DENIES SI AND NO PLAN
OTHER THAN GETTING BETTER, "I HAVE TOO MUCH TO LIVE FOR". REPORTED THIS DURING
MDR'S AND AN ORDER FOR SOCIAL WORK AND PSYCH ORDERED. WILL CONTINUE TO
MONITOR.

## 2017-06-28 NOTE — NUR
BACK FROM 1ST PART STRESS TEST, PATIENT TO RETURN TO NUCLEAR MEDICINE AT 1330.
NOTIFIED RESIDENT DR HARRISON #[id number] OF RETURN TO UNIT. PATIENT ALERT ORIENTED X3.
NO COMPLAINTS. VSS. PLACED BACK ON MONITOR. CALL BELL IN REACH.

## 2017-06-29 VITALS — SYSTOLIC BLOOD PRESSURE: 148 MMHG | DIASTOLIC BLOOD PRESSURE: 76 MMHG

## 2017-06-29 VITALS — SYSTOLIC BLOOD PRESSURE: 108 MMHG | DIASTOLIC BLOOD PRESSURE: 70 MMHG

## 2017-06-29 VITALS — DIASTOLIC BLOOD PRESSURE: 100 MMHG | SYSTOLIC BLOOD PRESSURE: 170 MMHG

## 2017-06-29 NOTE — PATIENT DISCHARGE INSTRUCTIONS
Discharge Instructions
 
General Discharge Information
You were seen/treated for:
Syncope/chest pain
Alcohol abuse
Acute kidney injury
Special Instructions:
1. please followup with your primary care after discharge
2. please followup with Dr. Adan, cardiologist after discharge
3. In an event of a psychiatric emergency call [phone], or go to nearest 
emergency.
 department.
4. If you would like to pursue medication or group therapy for treatment of 
mental health concerns please call [facility] 
Services at [phone] or [facility] at [phone]
[phone] to arrange for an intake appointment.
5. Holding yout lisinopril because of kidney damage. we are starting a new 
medication, Amlodipine for your blood pressure. Please ask your PCP to repeat 
your kidney functions in 3-4 days.
6. Maintain adequate hydration
 
Diet
Continue normal diet: Yes
 
Activity
Full Activity/No Limits: Yes
 
Acute Coronary Syndrome
 
Inclusion Criteria
At DC or during hospital stay patient has or had the following:
ACS DIAGNOSIS No
 
Discharge Core Measures
Meds if any: Prescribed or Continued at Discharge
Meds if any: NOT Prescribed or Continued at Discharge
 
Congestive Heart Failure
 
Inclusion Criteria
At DC or during hospital stay patient has or had the following:
CHF DIAGNOSIS No
 
Discharge Core Measures
Meds if any: Prescribed or Continued at Discharge
Meds if any: NOT Prescribed or Continued at Discharge
 
Cerebrovascular accident
 
Inclusion Criteria
At DC or during hospital stay patient has or had the following:
CVA/TIA Diagnosis No
 
Discharge Core Measures
Meds if any: Prescribed or Continued at Discharge
Meds if any: NOT Prescribed or Continued at Discharge
 
Venous thromboembolism
 
Inclusion Criteria
VTE Diagnosis No
VTE Type NONE
VTE Confirmed by (Test) NONE
 
Discharge Core Measures
- Per Current guidelines, there needs to be overlap
- treatment for the first 5 days of Warfarin therapy.
- If discharged on Warfarin prior to 5 days of
- overlap therapy, the patient will need to be
- assessed for post discharge needs including
- *Post discharge parental anticoagulation
- *Warfarin and/or parental anticoagulation education
- *Follow up date to check INR post discharge
At least 5 days overlap therapy as Inpatient No
Meds if any: Prescribed or Continued at Discharge
Note: Overlap Therapy is Warfarin and Anticoagulant
Meds if any: NOT Prescribed or Continued at Discharge

## 2017-06-29 NOTE — PN- PSYCHIATRY
Assessment/Plan
Impression:
Identifying Info:
62-year-old   male presents to [facility] emergency 
department on 06/25/2017 by ambulance intoxicated.  At time of admission 
, detox positive for cannabis and cocaine.  Admitted to medicine due to acute 
kidney injury.
 
SUBJECTIVE
Patient endorses some frustration regarding his kidney function not improving as
quickly as he would like.  No other new complaints today.  Again declines 
psychotropics at this time.
 
Brief ROS
Gait: Not observed
Sleep: Fair
Appetite: Adequate
 
OBJECTIVE
 
Mental Status Exam
Presentation/Appearance:  Cooperative with evaluation. Hospital garb. Lying in 
bed
Orientation: x3     
Sensorium:  Awake and alert
Eye contact: Appropriate
Affect: Somewhat blunted but congruent with stated mood
Mood: "Okay"
Depression:  Endorses 
Anxiety: Endorses but improved
Thought Content:
- Denies SI/HI, AH/VH, PI. States and also believes they will not kill 
themselves.
- Denies Hopeless/Helpless Thoughts
Thought Process: Linear
Associations: Appropriate
Speech: WNL
Judgment: Poor
Insight: Poor
Cognition:
 Memory: Grossly intact
 Attention/Concentration: Grossly intact
 Fund of Knowledge: Adequate
 Abstractions:Not asssessed
 MMSE: Not asssessed
 
 
ASSESSMENT
62-year-old   male presents unresponsive in the context of 
polysubstance intoxication.  At present he continues to minimize use.  Continues
endorse problems with mood and anxiety but declines treatment for substance 
issues or psychopharmacological treatment.  He is agreeable to considering 
individual therapy on an outpatient basis.
 
Diagnosis
Alcohol use disorder
Rule out cocaine use disorder
Rule out cannabis use disorder
Unspecified mood disorder versus substance induced mood disorder
 
A total of 30 minutes was spent with the patient with more than 50% of the time 
spent in counseling and/or coordination of care.
 
Suggestion:
1.  Continue CIWA, vitamin supplementation, Ativan taper.
 
2.  The patient has been provided with a list of area individual therapists.  He
reports he will f/u once he returns from vacation.
 
3.  Please include the following in patient discharge instructions, "If you 
would like to pursue medication or group therapy for treatment of mental health 
concerns please call [facility] at [phone]
[phone] or [facility]ensive [facility]patient Program at [phone] to 
arrange for an intake appointment." & "In an event of a psychiatric emergency 
call [phone], or go to nearest emergency department."
 
Thank you for including psychiatry we will sign off.  Reconsult as needed.
 
 
Subjective
Subjective:
as above
 
Objective
Last 24 Hrs of Vital Signs/I&O
 Current Medications
 
 
  Sig/Tomi Start time  Last
 
Medication Dose Route Stop Time Status Admin
 
Acetaminophen 650 MG Q6P PRN 06/25 2230 AC 
 
  PO   
 
Albuterol Sulfate 3 ML BID 06/26 1000 AC 06/29
 
  INH   0829
 
Albuterol Sulfate 3 ML Q4P PRN 06/26 0815 AC 
 
  INH   
 
Calcium Carbonate 500 MG ONCE ONE 06/28 2200 DC 06/28
 
  PO 06/28 2201  2211
 
Clonidine 0.1 MG TID PRN 06/27 1045 AC 06/27
 
  PO   1634
 
Folic Acid 1 MG DAILY 06/27 1000 AC 06/28
 
  PO   1140
 
Heparin Sodium  5,000 UNIT Q8 06/26 1400 AC 06/29
 
(Porcine)  SC   0633
 
Lorazepam 1 MG ONE ONE 06/29 0745 DC 
 
  PO 06/29 0746  
 
Lorazepam 1 MG BID 06/28 1800 CAN 
 
  PO   
 
Lorazepam 1 MG BID 06/28 1800 DC 06/28
 
  PO   1736
 
Lorazepam 1 MG Q12 06/28 1000 DC 
 
  PO   
 
Lorazepam See Dose Q1P PRN 06/25 2230 AC 
 
 Insts (1) IV   
 
Multivitamins 1 TAB DAILY 06/27 1000 AC 06/28
 
  PO   1140
 
Omeprazole 40 MG DAILY AC 06/26 0700 AC 06/29
 
  PO   0632
 
Patient Medication  1 ED .[facility]-MED ONE 06/28 1345 DC 
 
Teaching  ED 06/28 1346  
 
Polyethylene Glycol 17 GM DAILY 06/27 1000 AC 
 
  PO   
 
Senna/Docusate Sodium 1 TAB BID 06/27 1000 AC 06/27
 
  PO   2124
 
Sodium Chloride 1,000 ML Q13H 06/26 1000 AC 06/29
 
  IV   0149
 
Thiamine HCl 100 MG DAILY 06/27 1000 AC 06/28
 
  PO   1140
 
 
Dose Instructions:
(1)Lorazepam:
        See Admin Criteria
 
 Laboratory Tests
 
06/29/17 0639:
Anion Gap 8, Estimated GFR 39  L, BUN/Creatinine Ratio 11.7
 Vital Signs
 
 
Date Time Temp Pulse Resp B/P B/P Pulse O2 O2 Flow FiO2
 
     Mean Ox Delivery Rate 
 
06/29 0830      98 Room Air Room Air 
 
06/29 0739 99.2 85 20 148/76  97 Room Air  
 
06/28 2303 98.1 83 20 140/80  93 Room Air  
 
06/28 1905      95 Room Air  
 
06/28 1439 98.0 89 18 150/82  96 Room Air  
 
06/28 1115 96.6 94 18 130/80  97 Room Air  
 
 
 Intake & Output
 
 
 06/29 1600 06/29 0800 06/29 0000
 
Intake Total   900
 
Output Total   
 
Balance   900
 
    
 
Intake, IV   500
 
Intake, Oral   400

## 2017-06-29 NOTE — PN- ATT ADDEND
Attending Addendum
Attending Brief Note
Patient seen and examined.  Plan of care discussed with the medical team and the
patient.  Available lab work and radiology test reports were reviewed.  Patient 
has no specific new complaints today.  He no longer has any chest pressure or 
pain.  Denies any recent fever chills or difficulty breathing.  Patient is 
status post stress test yesterday.  Patient is anxious to go home because he is 
starting his vacation tomorrow.
 
 Vital Signs
 
 
Date Time Temp Pulse Resp B/P B/P Pulse O2 O2 Flow FiO2
 
     Mean Ox Delivery Rate 
 
06/29 0830      98 Room Air Room Air 
 
06/29 0739 99.2 85 20 148/76  97 Room Air  
 
06/28 2303 98.1 83 20 140/80  93 Room Air  
 
06/28 1905      95 Room Air  
 
 
 Intake & Output
 
 
 06/29 1600 06/29 0800 06/29 0000
 
Intake Total   900
 
Output Total   
 
Balance   900
 
    
 
Intake, IV   500
 
Intake, Oral   400
 
 
Exam:
General: Patient awake alert oriented without any distress 
CVS: S1 plus S2 without any murmur or gallops 
Chest: Few scattered crepitation without any wheeze.  There is no respiratory 
distress. 
Abdomen: Soft nontender, bowel sound present, no guarding or rebound 
CNS: Awake alert oriented without any focal neuro deficit and follows command  
appropriately 
Extremities: No edema; no clubbing or cyanosis noted 
 
 Laboratory Tests
 
 
 06/29
 
 0639
 
Chemistry 
 
  Sodium (137 - 145 mmol/L) 141
 
  Potassium (3.5 - 5.1 mmol/L) 4.3
 
  Chloride (98 - 107 mmol/L) 109  H
 
  Carbon Dioxide (22 - 30 mmol/L) 24
 
  Anion Gap (5 - 16) 8
 
  BUN (9 - 20 mg/dL) 21  H
 
  Creatinine (0.7 - 1.2 mg/dL) 1.8  H
 
  Estimated GFR (>60 ml/min) 39  L
 
  BUN/Creatinine Ratio (7 - 25 %) 11.7
 
 
Nuclear stress test
Probable normal exercise stress and resting myocardial perfusion
study with normal left ventricular wall motion and ejection fraction.
Decreased activity in the inferior wall is likely due to attenuation by the
adjacent diaphragm. However, because the patient was unable to achieve an
adequate heart rate response, significant myocardial ischemia during exercise
cannot be ruled out.
 
Now the patient was not able to complete his stress test due to fatigue.  
 
Echocardiogram
Normal size left ventricle. 
 Mild concentric left ventricular hypertrophy. 
 Normal left ventricular ejection fraction visually estimated at > 
 55%. 
 Abnormal relaxation filling pattern of the left ventricle for age  
 (stage 1 diastolic dysfunction). 
 Normal right ventricular size and function. 
 Normal atrial size. 
 Trace mitral regurgitation. 
 Unable to estimate the right ventricular systolic pressure. 
 
Assessment
1.  Syncopal episode.  Most likely due to alcohol and substance abuse vs ? 
cardiac; no evidence of arrhythmia
2.  Chest pain. ? ACS.  Negative EKGs and troponins 2.  Although nuclear stress
test does not reveal any acute ischemia status was inadequate due to fatigue and
early termination of the test.
3.  Alcohol and substnace abuse.  On CIWA protocol and Ativan (scheduled and PRN
). CIWA have been mostly low.  Patient seen by psychiatry and patient is 
recommended to follow-up with outpatient program if he desires 
4.  Acute kidney injury. FENa 2.7. likely ATN.  Improving.  BUN/creatinine 34/
2.3 on admission.   Renal ultrasound normal
5.  History of colonic polyps and hemorrhoids.  Colonoscopy every 3 years.  Last
colonoscopy on September 2016.
6.  History of BRBPR. last 2 days prior to admission
7.  History of anemia. 
8.  Sexton's esophagus/ Mild erosive gastritis and nonerosive duodenitis. Next 
EGD in September 2017.
9.  Hypertension.  Lisinopril on hold because of acute kidney injury plan
10.  suspected suicidal attempt - patient apparently has made statements to 
nursing staff alluding to suicidal attempt
 
Plan
* Patient stable for discharge
* Patient will need to follow with Dr. Adan for outpatient Persantine nuclear 
stress test
* Patient can follow with psychiatry as outpatient
Patient's wife was updated at the bedside.
Total time spent in preparation for discharge plan, patient education, and CMR 
preparation was 35 minutes.

## 2017-06-29 NOTE — PN- HOUSESTAFF
Subjective
Follow-up For:
Syncope/chest pain
Alcohol abuse
Acute kidney injury
Subjective:
This morning patient is very anxious to go home.  He is going on vacation to 
[place] tomorrow.  He denies any chest pain or discomfort or breathing 
difficulty.
 
Review of Systems
Constitutional:
Reports: see HPI. 
 
Objective
Last 24 Hrs of Vital Signs/I&O
 Vital Signs
 
 
Date Time Temp Pulse Resp B/P B/P Pulse O2 O2 Flow FiO2
 
     Mean Ox Delivery Rate 
 
 0830      98 Room Air Room Air 
 
 0739 99.2 85 20 148/76  97 Room Air  
 
 2303 98.1 83 20 140/80  93 Room Air  
 
 1905      95 Room Air  
 
 1439 98.0 89 18 150/82  96 Room Air  
 
 1115 96.6 94 18 130/80  97 Room Air  
 
 
 Intake & Output
 
 
  1600  0800  0000
 
Intake Total   900
 
Output Total   
 
Balance   900
 
    
 
Intake, IV   500
 
Intake, Oral   400
 
 
 
 
Physical Exam
General Appearance: Alert, Oriented X3, Cooperative, No Acute Distress
Neck: Supple
Cardiovascular: Regular Rate
Lungs: Clear to Auscultation
Abdomen: Normal Bowel Sounds, Soft, No Tenderness
Extremities: No Edema
Current Medications:
 Current Medications
 
 
  Sig/Tomi Start time  Last
 
Medication Dose Route Stop Time Status Admin
 
Acetaminophen 650 MG Q6P PRN  2230 AC 
 
  PO   
 
Albuterol Sulfate 3 ML BID  1000 AC 
 
  INH   0829
 
Albuterol Sulfate 3 ML Q4P PRN  0815 AC 
 
  INH   
 
Calcium Carbonate 500 MG ONCE ONE  2200 DC 
 
  PO  2201  2211
 
Clonidine 0.1 MG TID PRN  1045 AC 
 
  PO   1634
 
Folic Acid 1 MG DAILY  1000 AC 
 
  PO   1140
 
Heparin Sodium  5,000 UNIT Q8  1400 AC 
 
(Porcine)  SC   0633
 
Lorazepam 1 MG ONE ONE  0745 DC 
 
  PO  0746  
 
Lorazepam 1 MG BID  1800 CAN 
 
  PO   
 
Lorazepam 1 MG BID  1800 DC 
 
  PO   1736
 
Lorazepam 1 MG Q12  1000 DC 
 
  PO   
 
Lorazepam See Dose Q1P PRN  2230 AC 
 
 Insts (1) IV   
 
Multivitamins 1 TAB DAILY  1000 AC 
 
  PO   1140
 
Omeprazole 40 MG DAILY AC  0700 AC 
 
  PO   0632
 
Patient Medication  1 ED .[facility]-MED ONE  1345 DC 
 
Teaching  ED  1346  
 
Polyethylene Glycol 17 GM DAILY  1000 AC 
 
  PO   
 
Senna/Docusate Sodium 1 TAB BID  1000 AC 
 
  PO   2124
 
Sodium Chloride 1,000 ML Q13H  1000 AC 
 
  IV   0149
 
Thiamine HCl 100 MG DAILY  1000 AC 
 
  PO   1140
 
 
Dose Instructions:
(1)Lorazepam:
        See Admin Criteria
 
 
 
Last 24 Hrs of Lab/Jake Results
Last 24 Hrs of Labs/Mics:
 Laboratory Tests
 
17 0639:
Anion Gap 8, Estimated GFR 39  L, BUN/Creatinine Ratio 11.7
 
 
Lines/Diet/Fluids
Fluids/Infusions: NS at 75cc/hr
 
Assessment/Plan
Assessment:
History 62-year-old man with past medical history of hypertension, COPD, colonic
polyps and hemorrhoids.
 
Problem list
1.  Syncopal episode.  Most likely due to alcohol and substance abuse vs ? 
cardiac
 
2.  Chest pain. ? ACS.  Negative EKGs and troponins 2.  Stress test resting 
images normal.  patient was unable to achieve an adequate heart rate response, 
significant myocardial ischemia during exercise cannot be ruled out.
ECHO: stage 1 diastolic dysfunction. LVEF >55%.
 
3.  Alcohol and substnace abuse.  On CIWA protocol and Ativan (scheduled and PRN
).  CIWA score 0 in last 48 hours
 
4.  Acute kidney injury.This morning creatinine is 1.8.  Renal ultrasound normal
 
5.  History of colonic polyps and hemorrhoids.  Colonoscopy every 3 years.  Last
colonoscopy on 2016.
 
6.  History of BRBPR. last 2 days prior to admission
 
7.  History of anemia. 
 
8.  Sexton's esophagus/ Mild erosive gastritis and nonerosive duodenitis. Next 
EGD in 2017.
 
9.  Hypertension.  Lisinopril on hold because of acute kidney injury
 
PLAN
* Possible D/C today
* Finishing Ativan taper today, Ativan 1 mg x 1
* Continue multivitamins/thiamine/folic acid on discharge
* Follow-up PCP after discharge and repeat kidney functions
* Avoid nephrotoxins 
* Encourage increased Oral Hydration
* Continue holding Lisinopril because of a DENISE. ? Amlodipine upon DC
* Continue PPI
* Social work consult appreciated
* Psych consult appreciated
Problem List:
 1. Alcohol intoxication
 
 2. chest pain
 
Pain Ratin
Pain Location:
none
Pain Goal: Remain pain free
Pain Plan:
tylenol
Tomorrow's Labs & Rationales:
none
DVT/Prophylaxis: pharmacological

## 2017-06-29 NOTE — DISCHARGE SUMMARY
Visit Information
 
Visit Dates
Admission Date:
06/25/17
 
Discharge Date:
06/29/17
 
 
Hospital Course
 
Course
Attending Physician:
IRLANDA HERNANDES,BRENDEN
 
Primary Care Physician:
SANTY HERNANDES,REYNA KOTHARI
 
Hospital Course:
He is 62-year-old man with past medical history of hypertension, COPD, colonic 
polyps and hemorrhoids (Colonoscopy every 3 years.  Last colonoscopy on 
September 2016),  History of BRBPR, anemia, Sexton's esophagus/ Mild erosive 
gastritis and nonerosive duodenitis (Next EGD in September 2017).
 
Vitals: Afebrile, pulse in 70s, RR 16, blood pressure 157/81 transiently went to
91/55 improved to 114/76, saturating well on room air
 
On exam: A O 3, cooperative, no acute distress, neck supple, JVD normal, no 
lymphadenopathy, mucosa dry, no focal neurological deficit, no dependent edema, 
no obvious skin rashes or inflammation CVS: S1-S2, RRR. RS: Clear to auscultate 
bilaterally. Abdomen: Soft, NT, ND, bowel sounds present. 
 
Labs: Hemoglobin 11.1, MCV 88.2, otherwise CBC unremarkable. Bicarbonate 19, 
anion gap 13, BUN 34, creatinine 2.3, LFT unremarkable, troponin less than 0.01,
 
U tox positive for cocaine and THC
Serum alcohol 246
 
Imaging:
Head CT:
1. No acute intracranial hemorrhage or mass effect. 
2. Mild prominence of the sulci and mild hypoattenuation of the
periventricular white matter, which may represent chronic small vessel ischemic 
disease. 
 
CXR: Mild bibasilar atelectasis. 
 
He was admitted on telemetry floor.
 
 
1.  Syncopal episode.  Most likely due to alcohol and substance abuse vs ? 
cardiac.  His troponins 2 were negative and serial EKGs did not show any acute 
ST-T wave changes. 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 
disease (father succumbed to an MI at age 40 years, brother succumbed to an MI 
at age 40 years, another brother had several myocardial infarctions and 
succumbed to an MI at age 50 years). He was seen by Dr. Adan. He recommended 
to order an echocardiogram and scheduled him for a stress test. 
 
ECHOCARDIOGRAM 06/27/17
 Normal size left ventricle. 
 Mild concentric left ventricular hypertrophy. 
 Normal left ventricular ejection fraction visually estimated at > 55%. 
 Abnormal relaxation filling pattern of the left ventricle for age  
 (stage 1 diastolic dysfunction). 
 Normal right ventricular size and function. 
 Normal atrial size. 
 Trace mitral regurgitation. 
 Unable to estimate the right ventricular systolic pressure. 
 
EXERCISE STRESS AND RESTING SPECT MYOCARDIAL PERFUSION IMAGING STUDY 06/28/17
 
IMPRESSION: Probable normal exercise stress and resting myocardial perfusion
study with normal left ventricular wall motion and ejection fraction.
Decreased activity in the inferior wall is likely due to attenuation by the
adjacent diaphragm. However, because the patient was unable to achieve an
adequate heart rate response, significant myocardial ischemia during exercise
cannot be ruled out.
 
 
Patient was anxious to go home as he wanted to leave for vacation.  He was 
advised to follow-up with Dr. Adan who will schedule him for a pharmacological
stress test.
 
 
2.  Alcohol and substnace abuse.  Started him on CIWA protocol and Ativan (
scheduled and PRN).  Ativan dose was tapered daily.  Patient also expressed 
suicidal ideation to the nurse stating that alcohol abuse/overdose was 
intentional.  Psych consult was requested.  He was not suicidal at that time.  
Denied any previous suicidal attempt nor he was a threat to himself or others at
that time. Patient provided with contact info for [facility] outpatient 
psychiatric services if he would like to pursue psychiatric medication 
management.
 
4.  Acute kidney injury
On admission his creatinine was 2.3.  He was started on IV fluids.  Creatinine 
came down to 1.8.  Renal ultrasound was normal.
US-RENAL/KIDNEY
FINDINGS:
 
RIGHT KIDNEY: 11.4 x 6.0 x 6.1 cm (SAG x AP x TRV). The kidney is normal in
size, contour, and echogenicity. Renal cortical thickness is normal. No
calculi or focal parenchymal lesions. No hydronephrosis.
 
LEFT KIDNEY: 12.0 x 6.2 x 4.9 cm (SAG x AP x TRV). The kidney is normal in
size, contour, and echogenicity. Renal cortical thickness is normal. No
calculi or focal parenchymal lesions. No hydronephrosis.
 
BLADDER: Well-distended and normal. Bilateral ureteral jets are demonstrated.
Prevoid bladder volume is 182 mL. Postvoid bladder volume is 34 mL.
 
PROSTATE GLAND: Heterogeneous and borderline enlarged, measuring 3.9 x 3.7 x
4.9 cm.
 
 
5.  Hypertension.  Lisinopril was held because of acute kidney injury.  In 
hospital his blood pressures was managed with amlodipine x 1 and clonidine PRN. 
He was discharged on amlodipine.
 
Allergies:
Coded Allergies:
venom-honey bee (bee venom (honey bee)) (ANAPHYLAXIS 06/25/17)
morphine (VOMITING 05/19/16)
 
 
Disposition Summary
 
Disposition
Principal Diagnosis:
Alcohol abuse and detox
Chest pain
Additional Diagnosis:
Acute kidney injury
Discharge Disposition: home or self care
 
Discharge Instructions
 
General Discharge Information
Code Status: Full Code
Patient's Diet:
heart healthy
Patient's Activity:
independent
Follow-Up Instructions/Appts:
1. please followup with your primary care after discharge
2. please followup with Dr. Adan, cardiologist after discharge
3. In an event of a psychiatric emergency call [phone], or go to nearest 
emergency.
 department.
4. If you would like to pursue medication or group therapy for treatment of 
mental health concerns please call [facility] 
Services at [phone] or [facility]ensive Outpatient Program at [phone]
[phone] to arrange for an intake appointment.
5. Holding yout lisinopril because of kidney damage. we are starting a new 
medication, Amlodipine for your blood pressure. Please ask your PCP to repeat 
your kidney functions in 3-4 days.
6. Maintain adequate hydration
 
Medications at Discharge
Discharge Medications:
Stop taking the following medications:
Lisinopril (Lisinopril) 20 MG TABLET ORAL DAILY Qty = 50
 
Continue taking these medications:
Pantoprazole Sodium (Pantoprazole Sodium) 40 MG TABLET.
    1 Tablet ORAL DAILY
    Qty = 30
    Comments:
       NOT GIVEN IN HOSPITAL
       GAVE PRILOSEC ON 6/29/17 @ 630 AM
 
Albuterol Sulfate (Proair Hfa) 90 MCG [id number]
    2 Puff Inhale through mouth As Directed as needed for EMPHYSEMA
    Qty = 9
    Comments:
       Last Taken: 6/29/17
             Time: 830 AM
 
Start taking the following new medications:
Amlodipine Besylate (Amlodipine Besylate) 5 MG TABLET
    1 Tablet ORAL DAILY
    Qty = 30
    No Refills
    Comments:
       NOT GIVEN IN HOSPITAL
 
Folic Acid (Folic Acid) 1 MG TABLET
    1 Tablet ORAL DAILY
    Qty = 30
    No Refills
    Comments:
       Last Taken: 6/29/17
             Time: 930 AM
 
Multivitamin (One Daily Multivitamin) 1 EACH TABLET
    1 Tablet ORAL DAILY
    Qty = 30
    No Refills
    Comments:
       Last Taken: 6/29/17
             Time: 930 AM
 
Thiamine HCl (Vitamin B-1) 100 MG TABLET
    1 Tablet ORAL DAILY
    Qty = 30
    No Refills
    Comments:
       Last Taken: 6/29/17
             Time: 930 AM
 
 
Copies To:
SANTY HERNANDES,REYNA KOTHARI; DUNCAN HERNANDES,LIZETH BUSTOS

## 2018-05-30 ENCOUNTER — HOSPITAL ENCOUNTER (EMERGENCY)
Dept: HOSPITAL 68 - ERH | Age: 63
Discharge: ANOTHER HOSPITAL | End: 2018-05-30
Payer: COMMERCIAL

## 2018-05-30 VITALS — SYSTOLIC BLOOD PRESSURE: 168 MMHG | DIASTOLIC BLOOD PRESSURE: 74 MMHG

## 2018-05-30 VITALS — BODY MASS INDEX: 24.33 KG/M2 | HEIGHT: 67 IN | WEIGHT: 155 LBS
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
## 2018-05-30 DIAGNOSIS — S09.93XA: Primary | ICD-10-CM
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
## 2018-05-30 LAB
ABSOLUTE GRANULOCYTE CT: 4.4 /CUMM (ref 1.4–6.5)
BASOPHILS # BLD: 0 /CUMM (ref 0–0.2)
BASOPHILS NFR BLD: 0.5 % (ref 0–2)
EOSINOPHIL # BLD: 0 /CUMM (ref 0–0.7)
EOSINOPHIL NFR BLD: 0.4 % (ref 0–5)
ERYTHROCYTE [DISTWIDTH] IN BLOOD BY AUTOMATED COUNT: 18 % (ref 11.5–14.5)
GRANULOCYTES NFR BLD: 81.9 % (ref 42.2–75.2)
HCT VFR BLD CALC: 28.9 % (ref 42–52)
LYMPHOCYTES # BLD: 0.5 /CUMM (ref 1.2–3.4)
MCH RBC QN AUTO: 31.5 PG (ref 27–31)
MCHC RBC AUTO-ENTMCNC: 34.5 G/DL (ref 33–37)
MCV RBC AUTO: 91.5 FL (ref 80–94)
MONOCYTES # BLD: 0.4 /CUMM (ref 0.1–0.6)
PLATELET # BLD: 198 /CUMM (ref 130–400)
PMV BLD AUTO: 7.5 FL (ref 7.4–10.4)
RED BLOOD CELL CT: 3.16 /CUMM (ref 4.7–6.1)
WBC # BLD AUTO: 5.4 /CUMM (ref 4.8–10.8)

## 2018-05-30 PROCEDURE — G0480 DRUG TEST DEF 1-7 CLASSES: HCPCS
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
## 2019-01-30 NOTE — NUR
DR SANCHES IN ROOM FOR REEVAL AND TO DISCUSS PLAN Stop lisinopril and losartan. Try amlodipine 5 mg daily. Return in 1 week for nurse BP check.     For Erectile dysfunction start sildenafil. Take 50 mg once daily 1 hour before sexual activity as needed; maximum dose: 100 mg daily. It is important you tell a health professional if you are taking this medication and ever experience chest pain. The medications used to treat chest pain interact with the sildenafil.